# Patient Record
Sex: MALE | Race: WHITE | NOT HISPANIC OR LATINO | Employment: FULL TIME
[De-identification: names, ages, dates, MRNs, and addresses within clinical notes are randomized per-mention and may not be internally consistent; named-entity substitution may affect disease eponyms.]

---

## 2018-02-07 ENCOUNTER — RECORDS - HEALTHEAST (OUTPATIENT)
Dept: ADMINISTRATIVE | Facility: OTHER | Age: 48
End: 2018-02-07

## 2018-02-07 ENCOUNTER — OFFICE VISIT (OUTPATIENT)
Dept: FAMILY MEDICINE | Facility: CLINIC | Age: 48
End: 2018-02-07
Payer: OTHER MISCELLANEOUS

## 2018-02-07 VITALS
OXYGEN SATURATION: 97 % | HEIGHT: 65 IN | DIASTOLIC BLOOD PRESSURE: 92 MMHG | TEMPERATURE: 98 F | WEIGHT: 202 LBS | HEART RATE: 70 BPM | BODY MASS INDEX: 33.66 KG/M2 | SYSTOLIC BLOOD PRESSURE: 147 MMHG

## 2018-02-07 DIAGNOSIS — I10 BENIGN ESSENTIAL HYPERTENSION: Chronic | ICD-10-CM

## 2018-02-07 DIAGNOSIS — S46.011A RUPTURE OF RIGHT SUPRASPINATUS TENDON, INITIAL ENCOUNTER: ICD-10-CM

## 2018-02-07 DIAGNOSIS — M25.511 ACUTE PAIN OF RIGHT SHOULDER: Primary | ICD-10-CM

## 2018-02-07 DIAGNOSIS — Z02.6 ENCOUNTER RELATED TO WORKER'S COMPENSATION CLAIM: ICD-10-CM

## 2018-02-07 DIAGNOSIS — S46.811A TEAR OF RIGHT INFRASPINATUS TENDON, INITIAL ENCOUNTER: ICD-10-CM

## 2018-02-07 PROBLEM — E66.09 CLASS 1 OBESITY DUE TO EXCESS CALORIES WITHOUT SERIOUS COMORBIDITY IN ADULT: Chronic | Status: ACTIVE | Noted: 2018-02-07

## 2018-02-07 PROBLEM — E66.811 CLASS 1 OBESITY DUE TO EXCESS CALORIES WITHOUT SERIOUS COMORBIDITY IN ADULT: Chronic | Status: ACTIVE | Noted: 2018-02-07

## 2018-02-07 RX ORDER — IBUPROFEN 200 MG
200 TABLET ORAL EVERY 4 HOURS PRN
COMMUNITY
Start: 2018-02-07 | End: 2022-08-28

## 2018-02-07 ASSESSMENT — ENCOUNTER SYMPTOMS
CARDIOVASCULAR NEGATIVE: 1
GASTROINTESTINAL NEGATIVE: 1
RESPIRATORY NEGATIVE: 1

## 2018-02-07 NOTE — MR AVS SNAPSHOT
After Visit Summary   2/7/2018    Dyllan Quintanilla    MRN: [id number]           Patient Information     Date Of Birth          1970        Visit Information        Provider Department      2/7/2018 9:20 AM Jose Cope MD [facility]        Today's Diagnoses     Acute pain of right shoulder    -  1    Encounter related to worker's compensation claim          Care Instructions    MRI of your right shoulder  Follow up on you high blood pressure and health maintenance as soon as you are able to.   Ok to take anti-inflammatory medications to help with your pain such as Ibuprofen as you already are doing.     =======================================================================  Your medication list is printed, please keep this with you, it is helpful to bring this current list to any other medical appointments, the emergency room or hospital.    If you had lab testing today and your results are reassuring or normal they will be be mailed to you within 7 days.     If the lab tests need quick action we will call you with the results.   The phone number we will call with results is # [phone] (home) . If this is not the best number please call our clinic and change the number.    If you need any refills please call your pharmacy and they will contact us.    If you have any further concerns or wish to schedule another appointment you must call our office during normal business hours  [phone] (8-5:00 M-F)  If you have urgent medical questions that cannot wait  you may also call [phone] at any time of day.  If you have a medical emergency please call [phone].    Thank you for coming to [facility].            Follow-ups after your visit        Who to contact     Please call your clinic at [phone] to:    Ask questions about your health    Make or cancel appointments    Discuss your medicines    Learn about your test results    Speak to your doctor   If you have  "compliments or concerns about an experience at your clinic, or if you wish to file a complaint, please contact [facility] Patient Relations at [phone] or email us at [email or web address]         Additional Information About Your Visit        [employer] Information     [employer] is an electronic gateway that provides easy, online access to your medical records. With [employer], you can request a clinic appointment, read your test results, renew a prescription or communicate with your care team.     To sign up for [employer] visit the website at [email or web address]   You will be asked to enter the access code listed below, as well as some personal information. Please follow the directions to create your username and password.     Your access code is: [email or web address]  Expires: 2018 10:08 AM     Your access code will  in 90 days. If you need help or a new code, please contact your [facility] or call [phone] for assistance.        Care EveryWhere ID     This is your Care EveryWhere ID. This could be used by other organizations to access your [place] medical records  [id number]        Your Vitals Were     Pulse Temperature Height Pulse Oximetry BMI (Body Mass Index)       70 98  F (36.7  C) (Oral) 5' 4.57\" (164 cm) 97% 34.07 kg/m2        Blood Pressure from Last 3 Encounters:   18 (!) 147/92    Weight from Last 3 Encounters:   18 202 lb (91.6 kg)              We Performed the Following     MR Shoulder Right w/o Contrast        Primary Care Provider Office Phone # Fax #    Jose Cope [phone]       [facility] [place] [address]        Equal Access to Services     VERNELL CONKLIN : Hadii alesha French, waaxda luqadaha, qaybta kaalmada lawrenceyada, lashaun yepez. So [facility] [phone].    ATENCIÓN: Si habla español, tiene a haider disposición servicios gratuitos de " zuleika lingüísticmasha. Chavez al [phone].    We comply with applicable federal civil rights laws and [place] laws. We do not discriminate on the basis of race, color, national origin, age, disability, sex, sexual orientation, or gender identity.            Thank you!     Thank you for choosing [facility]  for your care. Our goal is always to provide you with excellent care. Hearing back from our patients is one way we can continue to improve our services. Please take a few minutes to complete the written survey that you may receive in the mail after your visit with us. Thank you!             Your Updated Medication List - Protect others around you: Learn how to safely use, store and throw away your medicines at www.disposemymeds.org.      Notice  As of 2/7/2018 10:09 AM    You have not been prescribed any medications.

## 2018-02-07 NOTE — PROGRESS NOTES
Assessment and Plan     1. Acute pain of right shoulder  Concern for rotator cuff rupture. Continue ibuprofen. Plan for MRI and further management. Note written for no more than 5lbs of activity with that shoulder.  - MR Shoulder Right w/o Contrast    2. Encounter related to worker's compensation claim  As above.  - MR Shoulder Right w/o Contrast    3. Benign essential hypertension  Recommended that the patient start being seen for this. He is here for work-comp today so was not otherwise evaluated.     Options for treatment and follow-up care were reviewed with the patient and/or guardian. Dyllan Quintanilla and/or guardian engaged in the decision making process and verbalized understanding of the options discussed and agreed with the final plan. I answered all of their questions.    Jose Cope III, MD, FAAFP  [facility] Faculty  02/07/18 10:08 AM           HPI:       Dyllan Quintanilla is a 47 year old  male  Patient presents with:  Work Comp: Right shoulder injury on 2/1/2018.  Medication Reconciliation: Completed. Currently taking OTC Ibuprofen 400mg as needed.     On 1 Feb he hurt his shoulder at work was working on a trailer up high and transferred to a ladder. Douglas a pop and had sudden onset of pain. Slightly better today. Didn't fall. At rest, there is minimal to do pain. Pain increases with movement. Feels week. No trauma. Denies numbness, tingling, or weakness of the rest of his hand. Some improvement with ibuprofen.    The patient denies signs and symptoms of hypertensive emergency.         PMHX:     Patient Active Problem List   Diagnosis     Benign essential hypertension     Acute pain of right shoulder     Class 1 obesity due to excess calories without serious comorbidity in adult       Current Outpatient Prescriptions   Medication Sig Dispense Refill     ibuprofen (ADVIL/MOTRIN) 200 MG tablet Take 1 tablet (200 mg) by mouth every 4 hours as needed for mild pain         Social History  "    Social History     Marital status: Single     Spouse name: N/A     Number of children: 4     Years of education: N/A     Occupational History     Not on file.     Social History Main Topics     Smoking status: Former Smoker     Types: Cigarettes     Smokeless tobacco: Never Used      Comment: Girlfriend smokes but outside     Alcohol use Yes     Drug use: No     Sexual activity: Yes     Partners: Female     Birth control/ protection: None     Other Topics Concern     Not on file     Social History Narrative    Has four daughters       No Known Allergies    No results found for this or any previous visit (from the past 24 hour(s)).         Review of Systems:     Review of Systems   Respiratory: Negative.    Cardiovascular: Negative.    Gastrointestinal: Negative.    All other systems reviewed and are negative.           Physical Exam:     Vitals:    02/07/18 0935 02/07/18 0947   BP: (!) 163/107 (!) 147/92   Pulse: 70    Temp: 98  F (36.7  C)    TempSrc: Oral    SpO2: 97%    Weight: 202 lb (91.6 kg)    Height: 5' 4.57\" (164 cm)      Body mass index is 34.07 kg/(m^2).    Physical Exam   Constitutional: He is oriented to person, place, and time and well-developed, well-nourished, and in no distress. He appears to not be writhing in pain.  Non-toxic appearance. He does not have a sickly appearance.   Pulmonary/Chest: No respiratory distress.   Musculoskeletal:        Right shoulder: He exhibits decreased range of motion (difficulty bring the arm across the body), pain (with active abduction and external rotation) and decreased strength (empty can and external rotation). He exhibits no tenderness, no bony tenderness, no swelling, no deformity and normal pulse.        Arms:  Neurological: He is alert and oriented to person, place, and time. Gait normal. GCS score is 15.   Skin: Skin is warm. No rash noted. No erythema.   Psychiatric: Affect normal.   Nursing note and vitals reviewed.      XR Shoulder Right 2 or More " S2/1/2018  [facility]  Result Narrative   XR SHOULDER RIGHT 2 OR MORE VWS  2/1/2018 6:43 PM    INDICATION: Right shoulder pain  COMPARISON: None.    FINDINGS: Mild degenerative changes.. No fracture or dislocation.

## 2018-02-07 NOTE — LETTER
RETURN TO WORK/SCHOOL FORM    2/7/2018    Re: Dyllan Quintanilla  1970      To Whom It May Concern:     Dyllan Quintanilla was seen in clinic today.  He may return to work with restrictions on 2/7/18.          Restrictions:  Limit use of right arm to lifting, carrying, pushing or pulling  no more than 5 pounds for 2 weeks. Will re-evaluate at that time.         Jose Cope MD  2/7/2018 10:16 AM

## 2018-02-07 NOTE — PATIENT INSTRUCTIONS
MRI of your right shoulder  Follow up on you high blood pressure and health maintenance as soon as you are able to.   Ok to take anti-inflammatory medications to help with your pain such as Ibuprofen as you already are doing.     =======================================================================  Your medication list is printed, please keep this with you, it is helpful to bring this current list to any other medical appointments, the emergency room or hospital.    If you had lab testing today and your results are reassuring or normal they will be be mailed to you within 7 days.     If the lab tests need quick action we will call you with the results.   The phone number we will call with results is # [phone] (home) . If this is not the best number please call our clinic and change the number.    If you need any refills please call your pharmacy and they will contact us.    If you have any further concerns or wish to schedule another appointment you must call our office during normal business hours  [phone] (8-5:00 M-F)  If you have urgent medical questions that cannot wait  you may also call [phone] at any time of day.  If you have a medical emergency please call [phone].    Thank you for coming to [facility].      Referral for ( TEST )  :      MR Shoulder Right w/o Contrast  LOCATION/PLACE/Provider :    [facility]  DATE & TIME :     2- at 1:00  PHONE :     [phone]  FAX :     [phone]  Appointment made by clinic staff/:    Ayleen      Referral for (Test): Orthopedic referral  Location/Place/Provider: [facility]   Date/Time: Monday, 2/26/2018 @ 12:30pm   Phone: [phone]  Fax: [phone]  Additional information/prep.:   Scheduled by: Mia COSTELLO CMA

## 2018-02-14 ENCOUNTER — HOSPITAL ENCOUNTER (OUTPATIENT)
Dept: RADIOLOGY | Facility: HOSPITAL | Age: 48
Discharge: HOME | End: 2018-02-14
Attending: FAMILY MEDICINE

## 2018-02-14 ENCOUNTER — HOSPITAL ENCOUNTER (OUTPATIENT)
Dept: MRI IMAGING | Facility: HOSPITAL | Age: 48
Discharge: HOME | End: 2018-02-14
Attending: FAMILY MEDICINE

## 2018-02-14 DIAGNOSIS — M25.511 ACUTE SHOULDER PAIN DUE TO TRAUMA, RIGHT: ICD-10-CM

## 2018-02-14 DIAGNOSIS — S60.559A: ICD-10-CM

## 2018-02-14 DIAGNOSIS — G89.11 ACUTE SHOULDER PAIN DUE TO TRAUMA, RIGHT: ICD-10-CM

## 2018-02-14 DIAGNOSIS — S00.95XA: ICD-10-CM

## 2018-02-19 NOTE — PROGRESS NOTES
1. Rupture of right supraspinatus tendon, initial encounter  Needs surgery. Referral placed.  - ORTHOPEDICS ADULT REFERRAL    2. Tear of right infraspinatus tendon, initial encounter  - ORTHOPEDICS ADULT REFERRAL    Jose Cope III, MD, FAAFP  [facility] Residency Faculty  02/19/18 7:56 AM

## 2018-02-26 ENCOUNTER — RECORDS - HEALTHEAST (OUTPATIENT)
Dept: ADMINISTRATIVE | Facility: OTHER | Age: 48
End: 2018-02-26

## 2019-06-11 ENCOUNTER — OFFICE VISIT - HEALTHEAST (OUTPATIENT)
Dept: CARDIOLOGY | Facility: CLINIC | Age: 49
End: 2019-06-11

## 2019-06-11 DIAGNOSIS — I25.10 CORONARY ARTERY CALCIFICATION SEEN ON CAT SCAN: ICD-10-CM

## 2019-06-11 LAB
CHOLEST SERPL-MCNC: 192 MG/DL
FASTING STATUS PATIENT QL REPORTED: NO
HDLC SERPL-MCNC: 51 MG/DL
LDLC SERPL CALC-MCNC: 120 MG/DL
TRIGL SERPL-MCNC: 107 MG/DL

## 2019-06-11 ASSESSMENT — MIFFLIN-ST. JEOR: SCORE: 1704.94

## 2019-06-19 ENCOUNTER — HOSPITAL ENCOUNTER (OUTPATIENT)
Dept: NUCLEAR MEDICINE | Facility: CLINIC | Age: 49
Discharge: HOME | End: 2019-06-19
Attending: INTERNAL MEDICINE

## 2019-06-19 ENCOUNTER — HOSPITAL ENCOUNTER (OUTPATIENT)
Dept: CARDIOLOGY | Facility: CLINIC | Age: 49
Discharge: HOME | End: 2019-06-19
Attending: INTERNAL MEDICINE

## 2019-06-19 DIAGNOSIS — I25.10 CORONARY ARTERY CALCIFICATION SEEN ON CAT SCAN: ICD-10-CM

## 2019-06-19 LAB
CV STRESS CURRENT BP HE: NORMAL
CV STRESS CURRENT HR HE: 100
CV STRESS CURRENT HR HE: 101
CV STRESS CURRENT HR HE: 101
CV STRESS CURRENT HR HE: 106
CV STRESS CURRENT HR HE: 107
CV STRESS CURRENT HR HE: 107
CV STRESS CURRENT HR HE: 111
CV STRESS CURRENT HR HE: 112
CV STRESS CURRENT HR HE: 113
CV STRESS CURRENT HR HE: 116
CV STRESS CURRENT HR HE: 120
CV STRESS CURRENT HR HE: 125
CV STRESS CURRENT HR HE: 128
CV STRESS CURRENT HR HE: 130
CV STRESS CURRENT HR HE: 131
CV STRESS CURRENT HR HE: 131
CV STRESS CURRENT HR HE: 71
CV STRESS CURRENT HR HE: 76
CV STRESS CURRENT HR HE: 78
CV STRESS CURRENT HR HE: 91
CV STRESS CURRENT HR HE: 92
CV STRESS CURRENT HR HE: 93
CV STRESS CURRENT HR HE: 94
CV STRESS CURRENT HR HE: 97
CV STRESS CURRENT HR HE: 98
CV STRESS CURRENT HR HE: 99
CV STRESS DEVIATION TIME HE: NORMAL
CV STRESS ECHO PERCENT HR HE: NORMAL
CV STRESS EXERCISE STAGE HE: NORMAL
CV STRESS EXERCISE STAGE REACHED HE: NORMAL
CV STRESS FINAL RESTING BP HE: NORMAL
CV STRESS FINAL RESTING HR HE: 92
CV STRESS MAX HR HE: 131
CV STRESS MAX TREADMILL GRADE HE: 14
CV STRESS MAX TREADMILL SPEED HE: 3.4
CV STRESS PEAK DIA BP HE: NORMAL
CV STRESS PEAK SYS BP HE: NORMAL
CV STRESS PHASE HE: NORMAL
CV STRESS PROTOCOL HE: NORMAL
CV STRESS RESTING PT POSITION HE: NORMAL
CV STRESS RESTING PT POSITION HE: NORMAL
CV STRESS ST DEVIATION AMOUNT HE: NORMAL
CV STRESS ST DEVIATION ELEVATION HE: NORMAL
CV STRESS ST EVELATION AMOUNT HE: NORMAL
CV STRESS TEST TYPE HE: NORMAL
CV STRESS TOTAL STAGE TIME MIN 1 HE: NORMAL
NUC STRESS EJECTION FRACTION: 63 %
STRESS ECHO BASELINE BP: NORMAL
STRESS ECHO BASELINE HR: 72
STRESS ECHO CALCULATED PERCENT HR: 77 %
STRESS ECHO LAST STRESS BP: NORMAL
STRESS ECHO LAST STRESS HR: 131
STRESS ECHO POST ESTIMATED WORKLOAD: 10.3
STRESS ECHO POST EXERCISE DUR MIN: 8
STRESS ECHO POST EXERCISE DUR SEC: 59
STRESS ECHO TARGET HR: 145

## 2019-06-21 ENCOUNTER — AMBULATORY - HEALTHEAST (OUTPATIENT)
Dept: CARDIOLOGY | Facility: CLINIC | Age: 49
End: 2019-06-21

## 2019-06-21 DIAGNOSIS — E78.5 HYPERLIPIDEMIA LDL GOAL <70: ICD-10-CM

## 2019-06-21 DIAGNOSIS — I25.10 CORONARY ARTERY CALCIFICATION SEEN ON CAT SCAN: ICD-10-CM

## 2020-04-14 ENCOUNTER — COMMUNICATION - HEALTHEAST (OUTPATIENT)
Dept: SCHEDULING | Facility: CLINIC | Age: 50
End: 2020-04-14

## 2021-05-29 NOTE — PROGRESS NOTES
===View-only below this line===    ----- Message -----  From: Suma Lindsay MD  Sent: 6/19/2019   3:01 PM  To: Brandon Salinas RN    I talked with Dyllan on the phone and went over his results. Can you help get him in to see me in 3 months with a cholesterol check at that time?    Thanks, EG
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
## 2021-05-29 NOTE — PATIENT INSTRUCTIONS - HE
- Set up an appointment with Internal medicine, the [facility] clinic in the [place], [place]. See either Dr. Lester or Dr. Barron.     - Start lisinopril 5 mg daily for your blood pressure. Call if you have a dry cough. Stop the medicine immediately if you get tongue or lip swelling.    - Stress test    - Check cholesterol today    - See me in 1 year

## 2021-06-03 VITALS — HEIGHT: 66 IN | BODY MASS INDEX: 32.14 KG/M2 | WEIGHT: 200 LBS

## 2021-06-07 NOTE — TELEPHONE ENCOUNTER
RN Triage,    Patient's friend is calling on behalf of patient (verified ok to speak with her). One week ago Dyllan woke up with purple and swollen ankle. Unsure what happened to it. According to leannerDiann, the swelling and discoloration is getting worse and worse. Today, Dyllan is having a very difficult time bearing weight and is unable to walk more than a few steps.    Given difficulty bearing weight, I advised that Dyllan be seen in ER/UR. Diann was agreeable to this plan and was going to consider taking him to [facility] or [facility].     Shaina Esparza RN  [facility] Nurse Advisor    Reason for Disposition    Can't stand (bear weight) or walk (e.g., 4 steps)    Protocols used: ANKLE AND FOOT INJURY-A-[place]

## 2021-07-03 NOTE — ADDENDUM NOTE
Addendum Note by Jason Salinas RN at 6/21/2019  2:32 PM     Author: Jason Salinas RN Service: -- Author Type: Registered Nurse    Filed: 6/21/2019  2:35 PM Encounter Date: 6/21/2019 Status: Signed    : Jason Salinas RN (Registered Nurse)    Addended by: JASON SALINAS on: 6/21/2019 02:35 PM        Modules accepted: Orders

## 2022-08-28 ENCOUNTER — APPOINTMENT (OUTPATIENT)
Dept: RADIOLOGY | Facility: HOSPITAL | Age: 52
End: 2022-08-28
Attending: EMERGENCY MEDICINE

## 2022-08-28 ENCOUNTER — HOSPITAL ENCOUNTER (EMERGENCY)
Facility: HOSPITAL | Age: 52
Discharge: HOME | End: 2022-08-28
Attending: EMERGENCY MEDICINE | Admitting: EMERGENCY MEDICINE
Payer: OTHER MISCELLANEOUS

## 2022-08-28 VITALS
HEART RATE: 83 BPM | SYSTOLIC BLOOD PRESSURE: 156 MMHG | BODY MASS INDEX: 33.32 KG/M2 | TEMPERATURE: 97.7 F | DIASTOLIC BLOOD PRESSURE: 93 MMHG | HEIGHT: 65 IN | RESPIRATION RATE: 15 BRPM | WEIGHT: 200 LBS | OXYGEN SATURATION: 96 %

## 2022-08-28 DIAGNOSIS — M79.661 PAIN IN BOTH LOWER LEGS: ICD-10-CM

## 2022-08-28 DIAGNOSIS — M79.671 PAIN IN BOTH FEET: ICD-10-CM

## 2022-08-28 DIAGNOSIS — T07.XXXA ABRASIONS OF MULTIPLE SITES: ICD-10-CM

## 2022-08-28 DIAGNOSIS — M79.662 PAIN IN BOTH LOWER LEGS: ICD-10-CM

## 2022-08-28 DIAGNOSIS — M79.672 PAIN IN BOTH FEET: ICD-10-CM

## 2022-08-28 DIAGNOSIS — I10 HYPERTENSION, UNSPECIFIED TYPE: ICD-10-CM

## 2022-08-28 DIAGNOSIS — S93.401A SPRAIN OF RIGHT ANKLE, UNSPECIFIED LIGAMENT, INITIAL ENCOUNTER: ICD-10-CM

## 2022-08-28 DIAGNOSIS — T14.8XXA CRUSH INJURY: ICD-10-CM

## 2022-08-28 LAB
ALBUMIN SERPL-MCNC: 3.8 G/DL (ref 3.5–5)
ALP SERPL-CCNC: 68 U/L (ref 45–120)
ALT SERPL W P-5'-P-CCNC: 22 U/L (ref 0–45)
ANION GAP SERPL CALCULATED.3IONS-SCNC: 9 MMOL/L (ref 5–18)
APTT PPP: 23 SECONDS (ref 22–38)
AST SERPL W P-5'-P-CCNC: 19 U/L (ref 0–40)
BASOPHILS # BLD AUTO: 0 10E3/UL (ref 0–0.2)
BASOPHILS NFR BLD AUTO: 0 %
BILIRUB SERPL-MCNC: 0.3 MG/DL (ref 0–1)
BUN SERPL-MCNC: 21 MG/DL (ref 8–22)
CALCIUM SERPL-MCNC: 8.7 MG/DL (ref 8.5–10.5)
CHLORIDE BLD-SCNC: 105 MMOL/L (ref 98–107)
CO2 SERPL-SCNC: 23 MMOL/L (ref 22–31)
CREAT SERPL-MCNC: 0.93 MG/DL (ref 0.7–1.3)
EOSINOPHIL # BLD AUTO: 0 10E3/UL (ref 0–0.7)
EOSINOPHIL NFR BLD AUTO: 0 %
ERYTHROCYTE [DISTWIDTH] IN BLOOD BY AUTOMATED COUNT: 13.2 % (ref 10–15)
GFR SERPL CREATININE-BSD FRML MDRD: >90 ML/MIN/1.73M2
GLUCOSE BLD-MCNC: 131 MG/DL (ref 70–125)
HCT VFR BLD AUTO: 44 % (ref 40–53)
HGB BLD-MCNC: 14.6 G/DL (ref 13.3–17.7)
IMM GRANULOCYTES # BLD: 0 10E3/UL
IMM GRANULOCYTES NFR BLD: 0 %
INR PPP: 1.01 (ref 0.85–1.15)
LYMPHOCYTES # BLD AUTO: 1.2 10E3/UL (ref 0.8–5.3)
LYMPHOCYTES NFR BLD AUTO: 12 %
MCH RBC QN AUTO: 30.6 PG (ref 26.5–33)
MCHC RBC AUTO-ENTMCNC: 33.2 G/DL (ref 31.5–36.5)
MCV RBC AUTO: 92 FL (ref 78–100)
MONOCYTES # BLD AUTO: 0.5 10E3/UL (ref 0–1.3)
MONOCYTES NFR BLD AUTO: 5 %
NEUTROPHILS # BLD AUTO: 8.4 10E3/UL (ref 1.6–8.3)
NEUTROPHILS NFR BLD AUTO: 83 %
NRBC # BLD AUTO: 0 10E3/UL
NRBC BLD AUTO-RTO: 0 /100
PLATELET # BLD AUTO: 226 10E3/UL (ref 150–450)
POTASSIUM BLD-SCNC: 4.7 MMOL/L (ref 3.5–5)
PROT SERPL-MCNC: 7 G/DL (ref 6–8)
RBC # BLD AUTO: 4.77 10E6/UL (ref 4.4–5.9)
SODIUM SERPL-SCNC: 137 MMOL/L (ref 136–145)
WBC # BLD AUTO: 10.2 10E3/UL (ref 4–11)

## 2022-08-28 PROCEDURE — 85004 AUTOMATED DIFF WBC COUNT: CPT | Performed by: STUDENT IN AN ORGANIZED HEALTH CARE EDUCATION/TRAINING PROGRAM

## 2022-08-28 PROCEDURE — 73590 X-RAY EXAM OF LOWER LEG: CPT | Mod: RT

## 2022-08-28 PROCEDURE — 73630 X-RAY EXAM OF FOOT: CPT | Mod: RT

## 2022-08-28 PROCEDURE — 36415 COLL VENOUS BLD VENIPUNCTURE: CPT | Performed by: STUDENT IN AN ORGANIZED HEALTH CARE EDUCATION/TRAINING PROGRAM

## 2022-08-28 PROCEDURE — 250N000009 HC RX 250: Performed by: EMERGENCY MEDICINE

## 2022-08-28 PROCEDURE — 250N000011 HC RX IP 250 OP 636: Performed by: EMERGENCY MEDICINE

## 2022-08-28 PROCEDURE — 85610 PROTHROMBIN TIME: CPT | Performed by: STUDENT IN AN ORGANIZED HEALTH CARE EDUCATION/TRAINING PROGRAM

## 2022-08-28 PROCEDURE — 90471 IMMUNIZATION ADMIN: CPT | Performed by: EMERGENCY MEDICINE

## 2022-08-28 PROCEDURE — 96375 TX/PRO/DX INJ NEW DRUG ADDON: CPT

## 2022-08-28 PROCEDURE — 85730 THROMBOPLASTIN TIME PARTIAL: CPT | Performed by: STUDENT IN AN ORGANIZED HEALTH CARE EDUCATION/TRAINING PROGRAM

## 2022-08-28 PROCEDURE — 96376 TX/PRO/DX INJ SAME DRUG ADON: CPT

## 2022-08-28 PROCEDURE — 80053 COMPREHEN METABOLIC PANEL: CPT | Performed by: STUDENT IN AN ORGANIZED HEALTH CARE EDUCATION/TRAINING PROGRAM

## 2022-08-28 PROCEDURE — 96374 THER/PROPH/DIAG INJ IV PUSH: CPT

## 2022-08-28 PROCEDURE — 90715 TDAP VACCINE 7 YRS/> IM: CPT | Performed by: EMERGENCY MEDICINE

## 2022-08-28 PROCEDURE — 99285 EMERGENCY DEPT VISIT HI MDM: CPT | Mod: 25

## 2022-08-28 PROCEDURE — 73560 X-RAY EXAM OF KNEE 1 OR 2: CPT | Mod: LT

## 2022-08-28 RX ORDER — KETOROLAC TROMETHAMINE 15 MG/ML
15 INJECTION, SOLUTION INTRAMUSCULAR; INTRAVENOUS ONCE
Status: COMPLETED | OUTPATIENT
Start: 2022-08-28 | End: 2022-08-28

## 2022-08-28 RX ORDER — GINSENG 100 MG
CAPSULE ORAL ONCE
Status: COMPLETED | OUTPATIENT
Start: 2022-08-28 | End: 2022-08-28

## 2022-08-28 RX ORDER — IBUPROFEN 600 MG/1
600 TABLET, FILM COATED ORAL EVERY 8 HOURS PRN
Qty: 20 TABLET | Refills: 0 | Status: SHIPPED | OUTPATIENT
Start: 2022-08-28 | End: 2023-01-30

## 2022-08-28 RX ORDER — LIDOCAINE 40 MG/G
CREAM TOPICAL
Status: DISCONTINUED | OUTPATIENT
Start: 2022-08-28 | End: 2022-08-28 | Stop reason: HOSPADM

## 2022-08-28 RX ADMIN — BACITRACIN: 500 OINTMENT TOPICAL at 10:41

## 2022-08-28 RX ADMIN — HYDROMORPHONE HYDROCHLORIDE 1 MG: 1 INJECTION, SOLUTION INTRAMUSCULAR; INTRAVENOUS; SUBCUTANEOUS at 07:41

## 2022-08-28 RX ADMIN — CLOSTRIDIUM TETANI TOXOID ANTIGEN (FORMALDEHYDE INACTIVATED), CORYNEBACTERIUM DIPHTHERIAE TOXOID ANTIGEN (FORMALDEHYDE INACTIVATED), BORDETELLA PERTUSSIS TOXOID ANTIGEN (GLUTARALDEHYDE INACTIVATED), BORDETELLA PERTUSSIS FILAMENTOUS HEMAGGLUTININ ANTIGEN (FORMALDEHYDE INACTIVATED), BORDETELLA PERTUSSIS PERTACTIN ANTIGEN, AND BORDETELLA PERTUSSIS FIMBRIAE 2/3 ANTIGEN 0.5 ML: 5; 2; 2.5; 5; 3; 5 INJECTION, SUSPENSION INTRAMUSCULAR at 08:44

## 2022-08-28 RX ADMIN — HYDROMORPHONE HYDROCHLORIDE 1 MG: 1 INJECTION, SOLUTION INTRAMUSCULAR; INTRAVENOUS; SUBCUTANEOUS at 08:38

## 2022-08-28 RX ADMIN — KETOROLAC TROMETHAMINE 15 MG: 15 INJECTION, SOLUTION INTRAMUSCULAR; INTRAVENOUS at 07:40

## 2022-08-28 ASSESSMENT — ACTIVITIES OF DAILY LIVING (ADL): ADLS_ACUITY_SCORE: 35

## 2022-08-28 ASSESSMENT — ENCOUNTER SYMPTOMS
ABDOMINAL PAIN: 0
BACK PAIN: 0

## 2022-08-28 NOTE — ED NOTES
Cleansed patient abrasions on left knee and ankle, patted dry, applied bacitracin and placed bandage over abrasions. Fitted patient for crutches and applied ankle brace to right ankle. Educated patient on crutch use and patient able to walk with crutches and acknowledged understanding of crutch and ankle brace use.

## 2022-08-28 NOTE — DISCHARGE INSTRUCTIONS
Make sure you take your blood pressure medication as prescribed.    Check your blood pressure daily at home and record the results.  Bring the results with you to your doctor's appointment.

## 2022-08-28 NOTE — Clinical Note
Dyllan Quintanilla was seen and treated in our emergency department on 8/28/2022.  He may return to work on 09/01/2022.       If you have any questions or concerns, please don't hesitate to call.      Sulaiman Santos MD
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
## 2022-08-29 ENCOUNTER — PATIENT OUTREACH (OUTPATIENT)
Dept: CARE COORDINATION | Facility: CLINIC | Age: 52
End: 2022-08-29
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
## 2022-08-31 ENCOUNTER — OFFICE VISIT (OUTPATIENT)
Dept: FAMILY MEDICINE | Facility: CLINIC | Age: 52
End: 2022-08-31
Payer: OTHER MISCELLANEOUS

## 2022-08-31 ENCOUNTER — ANCILLARY PROCEDURE (OUTPATIENT)
Dept: GENERAL RADIOLOGY | Facility: CLINIC | Age: 52
End: 2022-08-31
Attending: STUDENT IN AN ORGANIZED HEALTH CARE EDUCATION/TRAINING PROGRAM
Payer: OTHER MISCELLANEOUS

## 2022-08-31 VITALS
DIASTOLIC BLOOD PRESSURE: 100 MMHG | WEIGHT: 200 LBS | HEIGHT: 66 IN | BODY MASS INDEX: 32.14 KG/M2 | OXYGEN SATURATION: 97 % | RESPIRATION RATE: 22 BRPM | SYSTOLIC BLOOD PRESSURE: 157 MMHG | HEART RATE: 78 BPM | TEMPERATURE: 97.9 F

## 2022-08-31 DIAGNOSIS — S99.912A ANKLE INJURY, LEFT, INITIAL ENCOUNTER: ICD-10-CM

## 2022-08-31 DIAGNOSIS — M79.662 PAIN OF LEFT LOWER LEG: ICD-10-CM

## 2022-08-31 DIAGNOSIS — M25.571 ACUTE RIGHT ANKLE PAIN: ICD-10-CM

## 2022-08-31 DIAGNOSIS — M25.571 ACUTE RIGHT ANKLE PAIN: Primary | ICD-10-CM

## 2022-08-31 PROCEDURE — 99204 OFFICE O/P NEW MOD 45 MIN: CPT | Mod: GC

## 2022-08-31 PROCEDURE — 73610 X-RAY EXAM OF ANKLE: CPT | Mod: TC | Performed by: RADIOLOGY
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
## 2022-08-31 NOTE — LETTER
[facility] [address] [place] [address]  Phone: [phone]  Fax: [phone]    August 31, 2022        Dyllan Quinatnilla  [address]  [place] [address]          To whom it may concern:    RE: Dyllan Quintanilla    Patient was seen and treated today at our clinic and missed work. He will be unable to return to work until September 9th at the earliest due to an ongoing injury. He will continue to be re-evaluated on a weekly basis.    Please contact me for questions or concerns.      Sincerely,        Frank Domínguez MD

## 2022-09-02 NOTE — PROGRESS NOTES
Preceptor Attestation:   Patient seen, evaluated and discussed with the resident. I have verified the content of the note, which accurately reflects my assessment of the patient and the plan of care.  Supervising Physician:Sarah Hinojosa MD  [facility]

## 2022-09-07 ENCOUNTER — OFFICE VISIT (OUTPATIENT)
Dept: FAMILY MEDICINE | Facility: CLINIC | Age: 52
End: 2022-09-07

## 2022-09-07 VITALS
OXYGEN SATURATION: 96 % | SYSTOLIC BLOOD PRESSURE: 154 MMHG | RESPIRATION RATE: 20 BRPM | TEMPERATURE: 98.6 F | BODY MASS INDEX: 28.57 KG/M2 | DIASTOLIC BLOOD PRESSURE: 104 MMHG | HEART RATE: 90 BPM | WEIGHT: 177 LBS

## 2022-09-07 DIAGNOSIS — S99.911D INJURY OF RIGHT ANKLE, SUBSEQUENT ENCOUNTER: ICD-10-CM

## 2022-09-07 DIAGNOSIS — S99.912D INJURY OF LEFT ANKLE, SUBSEQUENT ENCOUNTER: Primary | ICD-10-CM

## 2022-09-07 PROCEDURE — 99213 OFFICE O/P EST LOW 20 MIN: CPT | Mod: GC | Performed by: STUDENT IN AN ORGANIZED HEALTH CARE EDUCATION/TRAINING PROGRAM
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
## 2022-09-07 NOTE — LETTER
RETURN TO WORK/SCHOOL FORM    9/7/2022    Re: Dyllan Quintanilla  1970      To Whom It May Concern:     Dyllan Quintanilla was seen in clinic today. Patient may not return to work at this time. Patient will be reassessed on 9/23 and determination will be made at that time whether or not patient can return after that date.         Elpidio Castillo MD  9/7/2022 4:26 PM

## 2022-09-08 NOTE — PROGRESS NOTES
Preceptor Attestation:   Patient seen, evaluated and discussed with the resident. I have verified the content of the note, which accurately reflects my assessment of the patient and the plan of care.    Supervising Physician:Jose Cope    [facility]

## 2022-09-12 ENCOUNTER — HOSPITAL ENCOUNTER (OUTPATIENT)
Dept: PHYSICAL THERAPY | Facility: REHABILITATION | Age: 52
Discharge: HOME | End: 2022-09-12
Payer: OTHER MISCELLANEOUS

## 2022-09-12 DIAGNOSIS — S99.912D INJURY OF LEFT ANKLE, SUBSEQUENT ENCOUNTER: ICD-10-CM

## 2022-09-12 DIAGNOSIS — S99.911D INJURY OF RIGHT ANKLE, SUBSEQUENT ENCOUNTER: ICD-10-CM

## 2022-09-12 PROCEDURE — 97110 THERAPEUTIC EXERCISES: CPT | Mod: GP | Performed by: PHYSICAL THERAPIST

## 2022-09-12 PROCEDURE — 97161 PT EVAL LOW COMPLEX 20 MIN: CPT | Mod: GP | Performed by: PHYSICAL THERAPIST

## 2022-09-12 NOTE — ADDENDUM NOTE
Encounter addended by: Lydia Duron PT on: 9/12/2022 5:38 PM   Actions taken: Charge Capture section accepted

## 2022-09-12 NOTE — PROGRESS NOTES
09/12/22 0900   General Information   Type of Visit Initial OP Ortho PT Evaluation   Start of Care Date 09/12/22   Referring Physician Elpidio Castillo MD   Patient/Family Goals Statement Be able to walk good   Orders Evaluate and Treat   Date of Order 09/07/22   Certification Required? No   Medical Diagnosis Right and left ankle injuries 8/28/22   Surgical/Medical history reviewed Yes   Precautions/Limitations no known precautions/limitations       Present No   Body Part(s)   Body Part(s) Ankle/Foot   Presentation and Etiology   Pertinent history of current problem (include personal factors and/or comorbidities that impact the POC) Injury at work 8/28/22 hitching a susy from a semitrailer when truck pulled away and susy rolled over his legs as he was on his right side- right leg pinned under left leg. XCrays negative and pt given crutches and aircast braces.   Impairments A. Pain;B. Decreased WB tolerance;C. Swelling;D. Decreased ROM;E. Decreased flexibility;F. Decreased strength and endurance;G. Impaired balance;H. Impaired gait;I. Impaired skin integrity;M. Locking or catching;R. Other   Impairment comment Popping in both ankle, pain in knees   Functional Limitations perform activities of daily living;perform required work activities;perform desired leisure / sports activities   Symptom Location Both ankles - entire joint. He also reports knee pain- but not specific area   How/Where did it occur At work   Onset date of current episode/exacerbation 08/28/22   Chronicity New   Pain rating (0-10 point scale) Best (/10);Worst (/10)   Best (/10) 5   Worst (/10) 10   Pain quality A. Sharp;C. Aching   Frequency of pain/symptoms A. Constant   Pain/symptoms are: The same all the time  (Increased with WB)   Pain/symptoms exacerbated by B. Walking;J. ADL;K. Home tasks;L. Work tasks   Pain/symptoms eased by H. Cold;I. OTC medication(s);C. Rest   Progression of symptoms since onset: Improved   Current /  Previous Interventions   Diagnostic Tests: X-ray   X-ray Results Results   X-ray results IMPRESSION: Soft tissue swelling about the ankle. No evidence for fracture or disruption of ankle mortise.   Current Level of Function   Patient role/employment history A. Employed   Employment Comments    Living environment [place]   Current equipment-Gait/Locomotion Axillary crutches  (Poorly fit crutches. Axillary area does not have padding and per pt was issued this way from the ER. I encouraged him to obtain axillary pads. We did move the hand  up one notch to improve posture2.)   Fall Risk Screen   Fall screen completed by PT   Have you fallen 2 or more times in the past year? No   Have you fallen and had an injury in the past year? No   Is patient a fall risk? No   Abuse Screen (yes response referral indicated)   Feels Unsafe at Home or Work/School no   Feels Threatened by Someone no   Does Anyone Try to Keep You From Having Contact with Others or Doing Things Outside Your Home? no   Physical Signs of Abuse Present no   Ankle/Foot Objective Findings   Posterior Drawer Test Neg   Accessory Motion/Joint Mobility Marked tightness in both ankle and foot- all tarsals and cuneiforms reduced play   Anterior Drawer Test Neg   Side (if bilateral, select both right and left) Right;Left   Observation bilat lower leg swelling, moderate swelling of both ankles, open scrape, or rash lesion on shins/psorasis type   Integumentary skin lesion/rash on both lower legs- diffuse   Gait/Locomotion Walks very flexed in trunk using crutches- very slow, short strides bilaterally   Balance/ Proprioception (Single Leg Stance) Unable   Foot Position In Standing Pes cavus arch   Posture Kyphosis  (Lack of lumbar lordosis)   Ankle/Foot Special Tests Comments Intact to light touch both LE's. Knees ROM WFL   Right Gastroc (in WB) Flexibility Tight   Left Gastroc (in WB) Flexibility Tight   Right Soleus (in WB) Flexibility  Tight   Left Soleus (in WB) Flexibility Tight   Right PF/Inversion Strength 2   Right PF/Eversion Strength 2   Right PF Strength 2   Left PF/Inversion Strength 2+   Left PF/Eversion Strength 2+   Left PF Strength 2   Right DF (Knee Ext) AROM -1-2 degrees   Left DF (Knee Ext) AROM -1 deg   Right PF AROM 40 deg   Left PF AROM 40 deg   Right Calcanceal Inversion AROM 4 deg B   Left Calcanceal Inversion AROM 3 deg   Right Calcaneal Eversion AROM 3 deg B   Left Calcaneal Eversion AROM 2 deg   Right Great Toe Flexion AROM WFL/ Extension 60 deg   Left Great Toe Flexion AROM WFL, extension 60 deg   Right DF/Inversion Strength 2+   Left DF/Inversion Strength 2+   Right DF/Eversion Strength 2   Left DF/Eversion Strength 2+   Planned Therapy Interventions   Planned Therapy Interventions ADL retraining;balance training;gait training;joint mobilization;manual therapy;neuromuscular re-education;ROM;strengthening;stretching   Clinical Impression   Criteria for Skilled Therapeutic Interventions Met yes, treatment indicated   Influenced by the following impairments pain, decreased ROM and strength LE especially ankles   Functional limitations due to impairments walking, standing, balance, work,   Clinical Presentation Stable/Uncomplicated   Clinical Decision Making (Complexity) Low complexity   Therapy Frequency 2 times/Week   Predicted Duration of Therapy Intervention (days/wks) 12 weeks   Risk & Benefits of therapy have been explained Yes   Patient, Family & other staff in agreement with plan of care Yes   Clinical Impression Comments Dyllan is a 52 year old male who had a 3000 lb susy knock him onto his right side and roll over his legs and pin him until he could be freed on 8/28/22. Xrays of knees and ankles taken that day were negative for fracture. He is referred at this time for PT of these LE injuries. He is currently using crutches and aircast type ankle braces. PMH: HTN, CAD, hx of Mandible fx. Exam today shows marked loss  of bilateral AROM and strength. He does use crutches, but is able to take steps without them up to 5 feet. Patient would benefit from PT to improve ankle AROM. General LE strength, and progress LE gait and balance.   Education Assessment   Preferred Learning Style Listening;Reading;Demonstration;Pictures/video   ORTHO GOALS   PT Ortho Eval Goals 1;2   Ortho Goal 1   Goal Identifier Balance   Goal Description Pt will be able to SL stand for 6 seconds for WB and stairs without assistive device   Target Date 11/02/22   Ortho Goal 2   Goal Identifier Gait   Goal Description Pt will be able to walk 5 minutes without assistive device and <4/10 LE pain in 12 weeks   Target Date 12/09/22   Total Evaluation Time   PT Eval, Low Complexity Minutes (10621) 30

## 2022-09-19 ENCOUNTER — HOSPITAL ENCOUNTER (OUTPATIENT)
Dept: PHYSICAL THERAPY | Facility: REHABILITATION | Age: 52
Discharge: HOME | End: 2022-09-19
Payer: OTHER MISCELLANEOUS

## 2022-09-19 DIAGNOSIS — S99.911D INJURY OF RIGHT ANKLE, SUBSEQUENT ENCOUNTER: ICD-10-CM

## 2022-09-19 DIAGNOSIS — S99.912D INJURY OF LEFT ANKLE, SUBSEQUENT ENCOUNTER: Primary | ICD-10-CM

## 2022-09-19 PROCEDURE — 97110 THERAPEUTIC EXERCISES: CPT | Mod: GP | Performed by: PHYSICAL THERAPIST

## 2022-09-20 PROBLEM — T14.90XA SOFT TISSUE INJURY: Status: ACTIVE | Noted: 2022-09-20
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
## 2022-09-23 ENCOUNTER — OFFICE VISIT (OUTPATIENT)
Dept: FAMILY MEDICINE | Facility: CLINIC | Age: 52
End: 2022-09-23
Payer: OTHER MISCELLANEOUS

## 2022-09-23 ENCOUNTER — OFFICE VISIT (OUTPATIENT)
Dept: FAMILY MEDICINE | Facility: CLINIC | Age: 52
End: 2022-09-23

## 2022-09-23 VITALS
OXYGEN SATURATION: 96 % | WEIGHT: 179 LBS | SYSTOLIC BLOOD PRESSURE: 146 MMHG | HEIGHT: 66 IN | BODY MASS INDEX: 28.77 KG/M2 | TEMPERATURE: 99 F | HEART RATE: 87 BPM | RESPIRATION RATE: 22 BRPM | DIASTOLIC BLOOD PRESSURE: 95 MMHG

## 2022-09-23 VITALS
RESPIRATION RATE: 22 BRPM | HEART RATE: 87 BPM | DIASTOLIC BLOOD PRESSURE: 95 MMHG | WEIGHT: 179 LBS | OXYGEN SATURATION: 96 % | TEMPERATURE: 98.1 F | BODY MASS INDEX: 29.82 KG/M2 | SYSTOLIC BLOOD PRESSURE: 146 MMHG | HEIGHT: 65 IN

## 2022-09-23 DIAGNOSIS — T14.90XA SOFT TISSUE INJURY: Primary | ICD-10-CM

## 2022-09-23 DIAGNOSIS — I25.10 CORONARY ARTERY DISEASE INVOLVING NATIVE CORONARY ARTERY OF NATIVE HEART WITHOUT ANGINA PECTORIS: ICD-10-CM

## 2022-09-23 DIAGNOSIS — E66.3 OVERWEIGHT: ICD-10-CM

## 2022-09-23 DIAGNOSIS — I10 BENIGN ESSENTIAL HYPERTENSION: Primary | ICD-10-CM

## 2022-09-23 DIAGNOSIS — I10 PRIMARY HYPERTENSION: ICD-10-CM

## 2022-09-23 DIAGNOSIS — Z59.9 FINANCIAL PROBLEMS: ICD-10-CM

## 2022-09-23 DIAGNOSIS — Z78.9 ALCOHOL USE: ICD-10-CM

## 2022-09-23 DIAGNOSIS — Z59.71 DOES NOT HAVE HEALTH INSURANCE: ICD-10-CM

## 2022-09-23 DIAGNOSIS — L40.9 PSORIASIS: ICD-10-CM

## 2022-09-23 LAB
ALBUMIN UR-MCNC: 30 MG/DL
ANION GAP SERPL CALCULATED.3IONS-SCNC: 14 MMOL/L (ref 3–14)
APPEARANCE UR: CLEAR
BACTERIA #/AREA URNS HPF: ABNORMAL /HPF
BILIRUB UR QL STRIP: NEGATIVE
BUN SERPL-MCNC: 21 MG/DL (ref 7–30)
CALCIUM SERPL-MCNC: 10 MG/DL (ref 8.5–10.1)
CHLORIDE BLD-SCNC: 100 MMOL/L (ref 94–109)
CHOLEST SERPL-MCNC: 194 MG/DL
CO2 SERPL-SCNC: 27 MMOL/L (ref 20–32)
COLOR UR AUTO: YELLOW
CREAT SERPL-MCNC: 1.1 MG/DL (ref 0.66–1.25)
GFR SERPL CREATININE-BSD FRML MDRD: 81 ML/MIN/1.73M2
GLUCOSE BLD-MCNC: 89 MG/DL (ref 70–99)
GLUCOSE UR STRIP-MCNC: NEGATIVE MG/DL
HDLC SERPL-MCNC: 52 MG/DL
HGB UR QL STRIP: NEGATIVE
KETONES UR STRIP-MCNC: NEGATIVE MG/DL
LDLC SERPL CALC-MCNC: 118 MG/DL
LEUKOCYTE ESTERASE UR QL STRIP: NEGATIVE
NITRATE UR QL: NEGATIVE
NONHDLC SERPL-MCNC: 142 MG/DL
PH UR STRIP: 5.5 [PH] (ref 5–7)
POTASSIUM BLD-SCNC: 4.9 MMOL/L (ref 3.4–5.3)
RBC #/AREA URNS AUTO: ABNORMAL /HPF
SODIUM SERPL-SCNC: 141 MMOL/L (ref 133–144)
SP GR UR STRIP: >=1.03 (ref 1–1.03)
SPERM #/AREA URNS HPF: PRESENT /HPF
TRIGL SERPL-MCNC: 121 MG/DL
UROBILINOGEN UR STRIP-ACNC: 0.2 E.U./DL
WBC #/AREA URNS AUTO: ABNORMAL /HPF

## 2022-09-23 PROCEDURE — 99214 OFFICE O/P EST MOD 30 MIN: CPT | Mod: GC | Performed by: STUDENT IN AN ORGANIZED HEALTH CARE EDUCATION/TRAINING PROGRAM

## 2022-09-23 PROCEDURE — 81001 URINALYSIS AUTO W/SCOPE: CPT | Performed by: STUDENT IN AN ORGANIZED HEALTH CARE EDUCATION/TRAINING PROGRAM

## 2022-09-23 PROCEDURE — 80061 LIPID PANEL: CPT | Performed by: STUDENT IN AN ORGANIZED HEALTH CARE EDUCATION/TRAINING PROGRAM

## 2022-09-23 PROCEDURE — 36415 COLL VENOUS BLD VENIPUNCTURE: CPT | Performed by: STUDENT IN AN ORGANIZED HEALTH CARE EDUCATION/TRAINING PROGRAM

## 2022-09-23 PROCEDURE — 99213 OFFICE O/P EST LOW 20 MIN: CPT | Mod: GC | Performed by: STUDENT IN AN ORGANIZED HEALTH CARE EDUCATION/TRAINING PROGRAM

## 2022-09-23 PROCEDURE — 80048 BASIC METABOLIC PNL TOTAL CA: CPT | Performed by: STUDENT IN AN ORGANIZED HEALTH CARE EDUCATION/TRAINING PROGRAM

## 2022-09-23 RX ORDER — ATORVASTATIN CALCIUM 40 MG/1
40 TABLET, FILM COATED ORAL DAILY
Qty: 90 TABLET | Refills: 3 | Status: SHIPPED | OUTPATIENT
Start: 2022-09-23 | End: 2022-09-23

## 2022-09-23 RX ORDER — LOSARTAN POTASSIUM 25 MG/1
25 TABLET ORAL DAILY
Qty: 90 TABLET | Refills: 0 | Status: CANCELLED | OUTPATIENT
Start: 2022-09-23

## 2022-09-23 RX ORDER — LOSARTAN POTASSIUM 25 MG/1
25 TABLET ORAL DAILY
Qty: 90 TABLET | Refills: 0 | Status: SHIPPED | OUTPATIENT
Start: 2022-09-23 | End: 2022-09-23

## 2022-09-23 RX ORDER — ATORVASTATIN CALCIUM 40 MG/1
40 TABLET, FILM COATED ORAL DAILY
Qty: 90 TABLET | Refills: 3 | Status: SHIPPED | OUTPATIENT
Start: 2022-09-23

## 2022-09-23 RX ORDER — LOSARTAN POTASSIUM 25 MG/1
25 TABLET ORAL DAILY
Qty: 90 TABLET | Refills: 0 | Status: SHIPPED | OUTPATIENT
Start: 2022-09-23

## 2022-09-23 SDOH — ECONOMIC STABILITY - INCOME SECURITY: PROBLEM RELATED TO HOUSING AND ECONOMIC CIRCUMSTANCES, UNSPECIFIED: Z59.9
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
## 2022-09-23 NOTE — LETTER
September 26, 2022      Dyllan Quintanilla  [address] [place] [address]    Mr. Quintanilla,    Here are your results from your recent visit.     Your cholesterol levels could be a bit better, we talked about this last visit and the lipitor should help with this. Your urine had some protein in it, thus we want to make sure we're being aggressive regarding the treatment of your blood pressure.     We'll talk more at your visit.     Resulted Orders   Basic metabolic panel   Result Value Ref Range    Sodium 141 133 - 144 mmol/L    Potassium 4.9 3.4 - 5.3 mmol/L    Chloride 100 94 - 109 mmol/L    Carbon Dioxide (CO2) 27 20 - 32 mmol/L    Anion Gap 14 3 - 14 mmol/L    Urea Nitrogen 21 7 - 30 mg/dL    Creatinine 1.10 0.66 - 1.25 mg/dL    Calcium 10.0 8.5 - 10.1 mg/dL    Glucose 89 70 - 99 mg/dL    GFR Estimate 81 >60 mL/min/1.73m2      Comment:      Effective December 21, 2021 eGFRcr in adults is calculated using the 2021 CKD-EPI creatinine equation which includes age and gender (Garfield nichols al., NEJ, DOI: 10.1056/QZDYwe5156990)   Lipid Profile   Result Value Ref Range    Cholesterol 194 <200 mg/dL    Triglycerides 121 <150 mg/dL    Direct Measure HDL 52 >=40 mg/dL    LDL Cholesterol Calculated 118 (H) <=100 mg/dL    Non HDL Cholesterol 142 (H) <130 mg/dL    Narrative    Cholesterol  Desirable:  <200 mg/dL    Triglycerides  Normal:  Less than 150 mg/dL  Borderline High:  150-199 mg/dL  High:  200-499 mg/dL  Very High:  Greater than or equal to 500 mg/dL    Direct Measure HDL  Female:  Greater than or equal to 50 mg/dL   Male:  Greater than or equal to 40 mg/dL    LDL Cholesterol  Desirable:  <100mg/dL  Above Desirable:  100-129 mg/dL   Borderline High:  130-159 mg/dL   High:  160-189 mg/dL   Very High:  >= 190 mg/dL    Non HDL Cholesterol  Desirable:  130 mg/dL  Above Desirable:  130-159 mg/dL  Borderline High:  160-189 mg/dL  High:  190-219 mg/dL  Very High:  Greater than or equal to 220 mg/dL   Urinalysis, Micro If   Result  Value Ref Range    Color Urine Yellow Colorless, Straw, Light Yellow, Yellow    Appearance Urine Clear Clear    Glucose Urine Negative Negative mg/dL    Bilirubin Urine Negative Negative    Ketones Urine Negative Negative mg/dL    Specific Gravity Urine >=1.030 1.003 - 1.035    Blood Urine Negative Negative    pH Urine 5.5 5.0 - 7.0    Protein Albumin Urine 30  (A) Negative mg/dL    Urobilinogen Urine 0.2 0.2, 1.0 E.U./dL    Nitrite Urine Negative Negative    Leukocyte Esterase Urine Negative Negative   Urine Microscopic Exam   Result Value Ref Range    Bacteria Urine Moderate (A) None Seen /HPF    RBC Urine None Seen 0-2 /HPF /HPF    WBC Urine None Seen 0-5 /HPF /HPF    Sperm Urine Present (A) None Seen /HPF       If you have any questions or concerns, please call the clinic at the number listed above.       Sincerely,      Elpidio Catsillo MD

## 2022-09-23 NOTE — LETTER
WORK FORM    9/23/2022    Re: Dyllan Quintanilla  1970      To Whom It May Concern:     Dyllan Quintanilla was seen in clinic today. Patient cannot return to work for at least another 2 weeks when patient will then be reassessed (visit on 10/10/22) and further determination made regarding work status.            Elpidio Castillo MD  9/23/2022 1:56 PM

## 2022-09-23 NOTE — PROGRESS NOTES
Preceptor Attestation:  Patient's case reviewed and discussed with the resident, Elpidio Castillo MD, and I personally evaluated the patient. I agree with written assessment and plan of care.    Supervising Physician:  Maryann Umanzor MD   [facility]

## 2022-09-23 NOTE — PATIENT INSTRUCTIONS
DASH - Dietary Approaches to Stop Hypertension    Diet overview: Designed to treat or prevent HTN without medication using a diet low in saturated fat, cholesterol, and total fat and emphasizing vegetables, fruits, and low-fat dairy products that are naturally low in sodium; as well as limited amounts of whole grains, legumes, poultry, fish, and nuts. Also allows for small amounts of red meat. Rich in potassium, magnesium, calcium, and protein.    Standard DASH: limiting salt to 2,300 mg/day  Lower sodium DASH: limiting salt to 1,500 mg/day, may have a greater effect on blood pressure with the combined approach of limiting salt and using a DASH diet.    Diet Example:   4-5 servings/day of fruits and vegetables   6-8 servings/day of grains  2-3 servings of low-fat dairy  6 or fewer 1oz servings/day of meat, poultry, fish   4-5 servings/week nuts, seeds, legumes  2-3 servings/day of unsaturated fats and oils:   5 servings/week of sweets    Effect on health outcome:  CV Health:  Lowers systolic and diastolic blood pressure and LDL cholesterol. This helps reduce risk and prevent cardiovascular disease including cardiovascular mortality, heart attack, and stroke. Reduces risk of heart failure in women.    Diabetes:   Reduces the amount of sugary, processed foods  Reduces cardiovascular risk factors, which includes diabetes  Reduced risk of being overweight in children 12 years of age    Weight loss/maintenance   May experience weight loss, although not the goal as it still includes about 2,000 calories a day. May have to reduce daily calories further for weight loss goals.    Diet Pros:   Proven to lower bp and reduce CV risk factors and prevent heart attack, stroke,   Reduces need for medications  Reduced blood pressure in as little as 2 weeks  Greater reduction in blood pressure with higher blood pressure    Diet Cons:   Navigating food labels can be difficult  Access to healthy food options i.e. food deserts, can be  expensive    Where to get more info:  [facility], [email or web address]  American Heart Association, heart.org  National Heart, Lung, and Blood [place], nhlbi.nih.gov  American Academy of Family Physicians, familydoctor.org        ------------------------------------------------  Mediterranean diet-Plant based    Diet overview: Largely plant-based using minimally processed fruits, vegetables, bread, seeds, beans, nuts. Olive oil is used as the primary source of fats. Avoidance of red meats, white-flour products, and sweets. Using herbs to enhance flavor instead of salt or fat. Mediterranean countries had fewer deaths related to coronary heart disease because their diet reduces CVD risk factors.    Diet Example:  Fresh fruits: 3 servings/day (1 serving =   -1 cup)  Vegetables: over 3 servings/day (1 serving =   -1 cup)  Legumes (beans and lentils): 3 servings/week  Extra virgin olive oil: at least 1 Tbsp/day (no more than 4 Tbsp)  Fish: 3 servings/week (1 serving = 3-4 oz)  Nuts: At least 3 servings/week (1 serving = 1 oz or   cup)  Whole grains: 3-6 servings/day (1 serving =   cup cooked, 1 slice of bread, 1 oz dry cereal)  Poultry (white meat)  Dairy: fat-free or low-fat  Eggs: Limit egg yolks, 1/day  No red meat or no more than 1 serving /week (1 serving = 3 oz)  Avoid baked goods, sweets, or deserts  Red wine in moderation    Effect on health outcome:  CV Health:   may reduce risk for  heart attack, pulmonary embolism, stroke, and death related to heart problems.  may improve non-LDL cholesterol levels: increasing HDL and lowering Triglycerides    Diabetes   Reduces risk for metabolic syndrome (cluster of conditions including high blood pressure, high blood sugar, increased waist circumference, and abnormal cholesterol levels)   Metabolic syndrome can increase risk of stroke and diabetes  May reduce HbA1c and weight in patients with type 2 diabetes   Maybe even more than low-fat diet    Weight loss/maintenance    Associated with increased weight loss and lower BMI   Similar weight loss at 5 years to low-fat diets in patients with high risk CVD  Low carb Mediterranean diet may increase weight loss compared to other diets recommended to patients with diabetes    Diet Pros:   -Can be used long-term and integrate into any diet   -Allows for more calories from healthy fats like olive oil   -May protect against chronic conditions like cancer, Alzheimer s, and Parkinson    Diet Cons:  -Iron deficient patients should ensure they consume enough dietary irons (spinach) with vitamin C (strawberries).   -Will reduce calcium intake due to less dairy product consumption    Where to get more info:  [facility], [email or web address]  [facility], [email or web address]  American Academy of Family Physicians, familydoctor.org  American Heart Association, heart.org  National Institutes of Health MedlinePlus, medlineplus.gov

## 2022-09-25 RX ORDER — BETAMETHASONE DIPROPIONATE 0.5 MG/G
CREAM TOPICAL DAILY PRN
Qty: 15 G | Refills: 1 | Status: SHIPPED | OUTPATIENT
Start: 2022-09-25 | End: 2022-10-10

## 2022-09-26 ENCOUNTER — TELEPHONE (OUTPATIENT)
Dept: PHYSICAL THERAPY | Facility: REHABILITATION | Age: 52
End: 2022-09-26

## 2022-09-26 NOTE — TELEPHONE ENCOUNTER
Called pt and left message that we had an appt for him today, and that he should call our main desk with questions or appt question.

## 2022-09-27 ENCOUNTER — HOSPITAL ENCOUNTER (OUTPATIENT)
Dept: PHYSICAL THERAPY | Facility: REHABILITATION | Age: 52
Discharge: HOME | End: 2022-09-27
Payer: OTHER MISCELLANEOUS

## 2022-09-27 DIAGNOSIS — S99.911D INJURY OF RIGHT ANKLE, SUBSEQUENT ENCOUNTER: ICD-10-CM

## 2022-09-27 DIAGNOSIS — S99.912D INJURY OF LEFT ANKLE, SUBSEQUENT ENCOUNTER: Primary | ICD-10-CM

## 2022-09-27 PROCEDURE — 97110 THERAPEUTIC EXERCISES: CPT | Mod: GP | Performed by: PHYSICAL THERAPIST

## 2022-09-28 ENCOUNTER — PATIENT OUTREACH (OUTPATIENT)
Dept: CARE COORDINATION | Facility: CLINIC | Age: 52
End: 2022-09-28

## 2022-09-28 ENCOUNTER — DOCUMENTATION ONLY (OUTPATIENT)
Dept: CARE COORDINATION | Facility: CLINIC | Age: 52
End: 2022-09-28

## 2022-09-28 DIAGNOSIS — Z59.71 DOES NOT HAVE HEALTH INSURANCE: Primary | ICD-10-CM

## 2022-09-28 NOTE — PROGRESS NOTES
Clinic Care Coordination Contact  Care Team Conversations    SW referred patient to [facility] FRW team this date to discuss options available to patient for health insurance. Patient referred to SW by PCP due to lack of health insurance. FRW to reach out to patient in next business day.    JEWELS SARGENT, DAVID, LADC

## 2022-09-28 NOTE — PROGRESS NOTES
Clinic Care Coordination Contact  Program: Health Insurance   County:  [facility] Case #:  [facility] Worker:   Salma #:   Subscriber #:   Renewal:  Date Applied:     FRW Outreach:   9/28/22: Outreach attempted x 1. Left message on voicemail with call back information and requested return call.  Plan: FRW will call again within one week.     Health Insurance:      Referral/Screening:

## 2022-10-04 ENCOUNTER — HOSPITAL ENCOUNTER (OUTPATIENT)
Dept: PHYSICAL THERAPY | Facility: REHABILITATION | Age: 52
Discharge: HOME | End: 2022-10-04
Payer: OTHER MISCELLANEOUS

## 2022-10-04 DIAGNOSIS — S99.912D INJURY OF LEFT ANKLE, SUBSEQUENT ENCOUNTER: ICD-10-CM

## 2022-10-04 DIAGNOSIS — M62.81 GENERALIZED MUSCLE WEAKNESS: Primary | ICD-10-CM

## 2022-10-04 DIAGNOSIS — S99.911D INJURY OF RIGHT ANKLE, SUBSEQUENT ENCOUNTER: ICD-10-CM

## 2022-10-04 PROCEDURE — 97112 NEUROMUSCULAR REEDUCATION: CPT | Mod: GP | Performed by: PHYSICAL THERAPY ASSISTANT

## 2022-10-04 PROCEDURE — 97110 THERAPEUTIC EXERCISES: CPT | Mod: GP | Performed by: PHYSICAL THERAPY ASSISTANT

## 2022-10-05 ENCOUNTER — PATIENT OUTREACH (OUTPATIENT)
Dept: CARE COORDINATION | Facility: CLINIC | Age: 52
End: 2022-10-05

## 2022-10-05 NOTE — PROGRESS NOTES
Clinic Care Coordination Contact  Program: Health Insurance   County:  [facility] Case #:  [facility] Worker:   Salma #:   Subscriber #:   Renewal:  Date Applied:     FRW Outreach:   10/5/22: FRW called pt for attempt x 2. FRW left VM and will make 3rd attempt in 2 weeks.   9/28/22: Outreach attempted x 1. Left message on voicemail with call back information and requested return call.  Plan: FRW will call again within one week.     Health Insurance:      Referral/Screening:

## 2022-10-10 ENCOUNTER — OFFICE VISIT (OUTPATIENT)
Dept: FAMILY MEDICINE | Facility: CLINIC | Age: 52
End: 2022-10-10

## 2022-10-10 ENCOUNTER — OFFICE VISIT (OUTPATIENT)
Dept: FAMILY MEDICINE | Facility: CLINIC | Age: 52
End: 2022-10-10
Payer: OTHER MISCELLANEOUS

## 2022-10-10 VITALS
SYSTOLIC BLOOD PRESSURE: 139 MMHG | HEIGHT: 66 IN | DIASTOLIC BLOOD PRESSURE: 89 MMHG | WEIGHT: 176 LBS | OXYGEN SATURATION: 100 % | BODY MASS INDEX: 28.28 KG/M2 | TEMPERATURE: 98.1 F | RESPIRATION RATE: 18 BRPM | HEART RATE: 76 BPM

## 2022-10-10 DIAGNOSIS — E66.3 OVERWEIGHT (BMI 25.0-29.9): ICD-10-CM

## 2022-10-10 DIAGNOSIS — T14.90XA SOFT TISSUE INJURY: Primary | ICD-10-CM

## 2022-10-10 DIAGNOSIS — L40.9 PSORIASIS: ICD-10-CM

## 2022-10-10 DIAGNOSIS — I10 BENIGN ESSENTIAL HYPERTENSION: Primary | ICD-10-CM

## 2022-10-10 LAB
ANION GAP SERPL CALCULATED.3IONS-SCNC: 13 MMOL/L (ref 3–14)
BUN SERPL-MCNC: 14 MG/DL (ref 7–30)
CALCIUM SERPL-MCNC: 9.7 MG/DL (ref 8.5–10.1)
CHLORIDE BLD-SCNC: 100 MMOL/L (ref 94–109)
CO2 SERPL-SCNC: 27 MMOL/L (ref 20–32)
CREAT SERPL-MCNC: 0.8 MG/DL (ref 0.66–1.25)
GFR SERPL CREATININE-BSD FRML MDRD: >90 ML/MIN/1.73M2
GLUCOSE BLD-MCNC: 87 MG/DL (ref 70–99)
POTASSIUM BLD-SCNC: 4.2 MMOL/L (ref 3.4–5.3)
SODIUM SERPL-SCNC: 140 MMOL/L (ref 133–144)

## 2022-10-10 PROCEDURE — 99213 OFFICE O/P EST LOW 20 MIN: CPT | Mod: GC | Performed by: STUDENT IN AN ORGANIZED HEALTH CARE EDUCATION/TRAINING PROGRAM

## 2022-10-10 PROCEDURE — 80048 BASIC METABOLIC PNL TOTAL CA: CPT | Performed by: STUDENT IN AN ORGANIZED HEALTH CARE EDUCATION/TRAINING PROGRAM

## 2022-10-10 PROCEDURE — 36415 COLL VENOUS BLD VENIPUNCTURE: CPT | Performed by: STUDENT IN AN ORGANIZED HEALTH CARE EDUCATION/TRAINING PROGRAM

## 2022-10-10 PROCEDURE — 99214 OFFICE O/P EST MOD 30 MIN: CPT | Mod: GC | Performed by: STUDENT IN AN ORGANIZED HEALTH CARE EDUCATION/TRAINING PROGRAM

## 2022-10-10 RX ORDER — BETAMETHASONE DIPROPIONATE 0.5 MG/G
CREAM TOPICAL DAILY PRN
Qty: 15 G | Refills: 1 | Status: SHIPPED | OUTPATIENT
Start: 2022-10-10 | End: 2023-08-04

## 2022-10-10 RX ORDER — BETAMETHASONE DIPROPIONATE 0.5 MG/G
CREAM TOPICAL DAILY PRN
Qty: 15 G | Refills: 1 | Status: SHIPPED | OUTPATIENT
Start: 2022-10-10 | End: 2022-10-10

## 2022-10-10 NOTE — LETTER
October 10, 2022      Dyllan ALISON Dwight  [address] [place] [address]        Dear ,    We are writing to inform you of your test results.    Your repeat blood tests were normal.   Let me know if you have any other questions or concerns.     Resulted Orders   Basic metabolic panel   Result Value Ref Range    Sodium 140 133 - 144 mmol/L    Potassium 4.2 3.4 - 5.3 mmol/L    Chloride 100 94 - 109 mmol/L    Carbon Dioxide (CO2) 27 20 - 32 mmol/L    Anion Gap 13 3 - 14 mmol/L    Urea Nitrogen 14 7 - 30 mg/dL    Creatinine 0.80 0.66 - 1.25 mg/dL    Calcium 9.7 8.5 - 10.1 mg/dL    Glucose 87 70 - 99 mg/dL    GFR Estimate >90 >60 mL/min/1.73m2      Comment:      Effective December 21, 2021 eGFRcr in adults is calculated using the 2021 CKD-EPI creatinine equation which includes age and gender (Garfield et al., NEJM, DOI: 10.1056/AVSCfd6858421)       If you have any questions or concerns, please call the clinic at the number listed above.       Sincerely,    Elpidio Castillo MD

## 2022-10-10 NOTE — LETTER
October 10, 2022      Dyllanjluis Quintanilla  [address] [place] [address]        Dear ,    We are writing to inform you of your test results.    {results letter list:[id number]}    Resulted Orders   Basic metabolic panel   Result Value Ref Range    Sodium 140 133 - 144 mmol/L    Potassium 4.2 3.4 - 5.3 mmol/L    Chloride 100 94 - 109 mmol/L    Carbon Dioxide (CO2) 27 20 - 32 mmol/L    Anion Gap 13 3 - 14 mmol/L    Urea Nitrogen 14 7 - 30 mg/dL    Creatinine 0.80 0.66 - 1.25 mg/dL    Calcium 9.7 8.5 - 10.1 mg/dL    Glucose 87 70 - 99 mg/dL    GFR Estimate >90 >60 mL/min/1.73m2      Comment:      Effective December 21, 2021 eGFRcr in adults is calculated using the 2021 CKD-EPI creatinine equation which includes age and gender (Garfield et al., NEJM, DOI: 10.1056/DACOft5465493)       If you have any questions or concerns, please call the clinic at the number listed above.       Sincerely,      Jose Cope

## 2022-10-10 NOTE — PROGRESS NOTES
Assessment and Plan     (I10) Benign essential hypertension  (primary encounter diagnosis)  Comment: /89 today. Got BP monitor but not yet checking at home. Taking losartan 25mg daily since last visit.   Plan:   -Basic metabolic panel  -Continue losartan 25mg daily  -Counseled to keep BP log and discussed how to check BP  -Follow-up in 3 months with lipid recheck at that time    (L40.9) Psoriasis  Comment: Psoriasis over knees/elbows. Discussed prior but never got diprosone. Wants sent to [facility] so he can try goodrx there.  Plan:   -betamethasone dipropionate (DIPROSONE) 0.05 % external cream  -Counseled on emollients    (E66.3) Overweight (BMI 25.0-29.9)  Comment: BMI 28.41. Has lost a couple pounds purposely. Working on diet.  Plan:   -Counseled on lifestyle modifications      Options for treatment and follow-up care were reviewed with the patient and/or guardian. Dyllan Quintanilla and/or guardian engaged in the decision making process and verbalized understanding of the options discussed and agreed with the final plan.    Elpidio Castillo MD      Precepted today with: Jose Cope MD           HPI:       Dyllan Quintanilla is a 52 year old  male with pertinent hx of HTN, CAD without cardiac event, alcohol use, psoriasis who presents for BP follow-up:    Last visit 2 weeks ago:  (I10) Benign essential hypertension  (primary encounter diagnosis)  Comment: /95 today. Has monitor at home and is going to start checking now. Has been on lisinopril in the past but hasn't taken for over a year. Discussed options today and patient wants losartan.  Plan:   -Basic metabolic panel  -Lipid Profile  -Urinalysis, Micro If, Urine Microscopic Exam  -Start losartan (COZAAR) 25 MG tablet daily  -Counseled on lifestyle modifications  -Counseled on keeping BP log and how to correctly check BP at home  -Follow-up in 2 weeks for HTN visit and repeat/follow-up BMP     (I25.10) Coronary artery disease involving native coronary artery of  native heart without angina pectoris  Comment: Inducible ischemia at inferior and lateral regions on exercise EKG stress test in 2019. Followed with cards (Dr Lindsay) for a visit and was recommended to follow-up in 1 year and start lipitor and lisinopril. Unfortunately never saw cards again and didn't continue meds. Denies any angina-like sx within the last year.  Plan:   -Basic metabolic panel  -Lipid Profile  -Atorvastatin (LIPITOR) 40 MG tablet daily  -Losartan (COZAAR) 25 MG tablet daily  -Recommend repeating lipids in 3-6 months given starting lipitor today       -Recommend follow-up with cards     (E66.3) Overweight  Comment: BMI 28.89 today in setting of HTN and CAD. Fairly active awais. Gets a fair amount of calories from alcohol.  Plan:  -Counseled on lifestyle modifications   -Counseled on alcohol reduction     (Z72.89) Alcohol use  Comment: Drinking a case of alcohol a week (usually ~20 beers). No h/o withdrawal and doesn't feel like he's addicted or that it causes him issues but does understand now that he should cut back for the sake of his health. Does not want any meds for this.  Plan:  -Counseled on alcohol reduction     (Z59.9) Financial problems  (Z59.89) Does not have health insurance  Comment: Currently getting work comp for significant injury to LEs. Won't be able to work for at least the next 2 weeks. Doesn't have health insurance right now- opted prior to not get insurance through his work.   Plan:   -Message to      (L40.9) Psoriasis  Comment: Thickened plaque over b/l anterior knees and some plaque over R arm. Uses vaseline at home which helps.  Plan:   -Continue emollient tx  -Start betamethasone daily as needed, counseled on use    Todays visit:  Taking lipitor and losartan as prescribed.  Just got home BP monitor; hasn't started checking yet.  Has purposely lost 2 lbs over last few weeks; working on lifestyle mods.         PMHX:     Patient Active Problem List   Diagnosis     Benign  "essential hypertension     Soft tissue injury of b/l LEs       Current Outpatient Medications   Medication Sig Dispense Refill     betamethasone dipropionate (DIPROSONE) 0.05 % external cream Apply topically daily as needed (For psoriasis plaques) 15 g 1     atorvastatin (LIPITOR) 40 MG tablet Take 1 tablet (40 mg) by mouth daily 90 tablet 3     ibuprofen (ADVIL/MOTRIN) 600 MG tablet Take 1 tablet (600 mg) by mouth every 8 hours as needed for moderate pain (Patient not taking: No sig reported) 20 tablet 0     losartan (COZAAR) 25 MG tablet Take 1 tablet (25 mg) by mouth daily 90 tablet 0       Social History     Tobacco Use     Smoking status: Never     Smokeless tobacco: Never     Tobacco comments:     Girlfriend smokes but outside   Substance Use Topics     Alcohol use: Yes     Alcohol/week: 12.0 standard drinks     Drug use: Not Currently          Allergies   Allergen Reactions     No Known Allergies        No results found for this or any previous visit (from the past 24 hour(s)).         Review of Systems:     10 point ROS negative except for what is noted in HPI          Physical Exam:     Vitals:    10/10/22 0943 10/10/22 0945   BP: (!) 149/95 139/89   Pulse: 76    Resp: 18    Temp: 98.1  F (36.7  C)    SpO2: 100%    Weight: 79.8 kg (176 lb)    Height: 1.676 m (5' 6\")      Body mass index is 28.41 kg/m .    General: Sitting comfortably. No acute distress.   HEENT: Conjunctivae are clear, nonicteric.   Neck: No masses appreciated.  Respiratory: No respiratory distress. Lung sounds are clear without rales, ronchi, or wheezes.   Cardiac: RRR. No m/g/r. Brisk cap refill.  Abdominal: Abdomen is overweight, soft and non-tender without distention.  Extremities: B/L nonpitting swelling of ankles, improved since last exam. Improving ROM but still limited throughout all ankle movements.  Skin: Skin in warm without rashes.  Neurological: Slow gait favoring ankles.  Psychiatric: Good insight and reasoning.    "

## 2022-10-10 NOTE — LETTER
RETURN TO WORK FORM    10/10/2022    Re: Dyllan Quintanilla  1970      To Whom It May Concern:     Dyllan Quintanilla was seen in clinic today.  He may return to work with restrictions on 10/20/22          Restrictions: limited activities; no lifting objects >25 lbs.      Elpidio Castillo MD  10/10/2022 10:04 AM

## 2022-10-10 NOTE — LETTER
October 10, 2022      Dyllanjluis Quintanilla  [address] [place] [address]        Dear ,    We are writing to inform you of your test results.    {results letter list:[id number]}    Resulted Orders   Basic metabolic panel   Result Value Ref Range    Sodium 140 133 - 144 mmol/L    Potassium 4.2 3.4 - 5.3 mmol/L    Chloride 100 94 - 109 mmol/L    Carbon Dioxide (CO2) 27 20 - 32 mmol/L    Anion Gap 13 3 - 14 mmol/L    Urea Nitrogen 14 7 - 30 mg/dL    Creatinine 0.80 0.66 - 1.25 mg/dL    Calcium 9.7 8.5 - 10.1 mg/dL    Glucose 87 70 - 99 mg/dL    GFR Estimate >90 >60 mL/min/1.73m2      Comment:      Effective December 21, 2021 eGFRcr in adults is calculated using the 2021 CKD-EPI creatinine equation which includes age and gender (Garfield et al., NEJM, DOI: 10.1056/YNMCnx3740996)       If you have any questions or concerns, please call the clinic at the number listed above.       Sincerely,      Jose Cope

## 2022-10-10 NOTE — LETTER
RETURN TO WORK FORM    10/10/2022    Re: Dyllan Quintanilla  1970      To Whom It May Concern:     Dyllan Quintanilla was seen in clinic today..  He may return to work with restrictions on 10/20/22          Restrictions:  limited activities; no lifting objects >25 lbs. Please allow to sit for 5 minutes every hour.  Light duty restrictions : 11/10/2022      Elpidio Castillo MD  10/10/2022 10:24 AM

## 2022-10-10 NOTE — PROGRESS NOTES
Assessment and Plan     (T14.90XA) Soft tissue injury of b/l LEs  (primary encounter diagnosis)  Comment: Going to PT twice weekly still, is helping significantly but still has some edema and working on strength, pain, and balance. Exam today pertinent for improved by continued ankle swelling and some tenderness. After discussion today, think reasonable to plan to be back at work on light duty on 10/20/22 and then try to get off light duty a few weeks after. Patient to continue with PT and pain plan in the interim and to let me know if he needs adjustment of plan.  Plan:   -Note for off work till 10/20/22 then light duties till 11/10/22  -Counseled to see me back if needing adjustments  -Continue PT    Options for treatment and follow-up care were reviewed with the patient and/or guardian. Dyllan Quintanilla and/or guardian engaged in the decision making process and verbalized understanding of the options discussed and agreed with the final plan.    Elpidio Castillo MD      Precepted today with: Jose Cope MD           HPI:       Dyllan Quintanilla is a 52 year old male who presents for WC injury follow-up:    At last visit ~2 weeks ago:  (T14.90XA) Soft tissue injury of b/l LEs  (primary encounter diagnosis)  Comment: Going to PT twice weekly, is helping significantly but still lacks ROM and strength, dealing with pain and swelling. Hopeful that he'll be able to go back to week in a couple weeks. Just using tylenol and ibuprofen a couple times a day; avoids ibuprofen as able. Definitely think he needs at least 2 more weeks off before he can adequately and safely return to work.  Plan:   -Follow-up in 2 weeks on 10/10/22 for me for reassessment; work note until then     Todays visit:  Overall improving with PT.  Swelling improving.   Still working on balance, pain, strength.  Wants to get back to work but admits he can't do what his work needs him to do. Wants to at least get back for light duty.         PMHX:     Patient  Active Problem List   Diagnosis     Benign essential hypertension     Soft tissue injury of b/l LEs       Current Outpatient Medications   Medication Sig Dispense Refill     atorvastatin (LIPITOR) 40 MG tablet Take 1 tablet (40 mg) by mouth daily 90 tablet 3     betamethasone dipropionate (DIPROSONE) 0.05 % external cream Apply topically daily as needed (For psoriasis plaques) 15 g 1     ibuprofen (ADVIL/MOTRIN) 600 MG tablet Take 1 tablet (600 mg) by mouth every 8 hours as needed for moderate pain (Patient not taking: No sig reported) 20 tablet 0     losartan (COZAAR) 25 MG tablet Take 1 tablet (25 mg) by mouth daily 90 tablet 0       Social History     Tobacco Use     Smoking status: Never     Smokeless tobacco: Never     Tobacco comments:     Girlfriend smokes but outside   Substance Use Topics     Alcohol use: Yes     Alcohol/week: 12.0 standard drinks     Drug use: Not Currently          Allergies   Allergen Reactions     No Known Allergies        No results found for this or any previous visit (from the past 24 hour(s)).         Review of Systems:     10 point ROS negative except for what is noted in HPI          Physical Exam:     There were no vitals filed for this visit.  There is no height or weight on file to calculate BMI.    General: Sitting comfortably. No acute distress.   Respiratory: No respiratory distress. Lung sounds are clear without rales, ronchi, or wheezes.   Abdominal: Abdomen is overweight, soft and non-tender without distention.  Extremities: B/L nonpitting swelling of ankles, improved since last exam. Improving ROM but still limited throughout all ankle movements.  Skin: Skin in warm without rashes or bruising.  Neurological: Motor function is grossly normal. Gait bit slow, favoring ankles.  Psychiatric: Good insight and judgement.

## 2022-10-10 NOTE — LETTER
October 10, 2022      Dyllanjluis Quintanilla  [address] [place] [address]        Dear ,    We are writing to inform you of your test results.    {results letter list:[id number]}    Resulted Orders   Basic metabolic panel   Result Value Ref Range    Sodium 140 133 - 144 mmol/L    Potassium 4.2 3.4 - 5.3 mmol/L    Chloride 100 94 - 109 mmol/L    Carbon Dioxide (CO2) 27 20 - 32 mmol/L    Anion Gap 13 3 - 14 mmol/L    Urea Nitrogen 14 7 - 30 mg/dL    Creatinine 0.80 0.66 - 1.25 mg/dL    Calcium 9.7 8.5 - 10.1 mg/dL    Glucose 87 70 - 99 mg/dL    GFR Estimate >90 >60 mL/min/1.73m2      Comment:      Effective December 21, 2021 eGFRcr in adults is calculated using the 2021 CKD-EPI creatinine equation which includes age and gender (Garfield et al., NEJM, DOI: 10.1056/[id number])       If you have any questions or concerns, please call the clinic at the number listed above.       Sincerely,      Jose Cope

## 2022-10-11 NOTE — NURSING NOTE
Note/letter written for patient faxed to patient employer [employer] on 10/11/22 to fax number [phone]. [place]

## 2022-10-20 ENCOUNTER — PATIENT OUTREACH (OUTPATIENT)
Dept: CARE COORDINATION | Facility: CLINIC | Age: 52
End: 2022-10-20

## 2022-10-20 NOTE — PROGRESS NOTES
Clinic Care Coordination Contact  Program: Health Insurance   County:  [facility] Case #:  [facility] Worker:   Salma #:   Subscriber #:   Renewal:  Date Applied:     FRW Outreach:   10/20/22: FRW called pt for 3rd attempt and could not leave VM. Routing to CC team for failed attempts. Please send new referral in future.   10/5/22: FRW called pt for attempt x 2. FRW left VM and will make 3rd attempt in 2 weeks.   9/28/22: Outreach attempted x 1. Left message on voicemail with call back information and requested return call.  Plan: FRW will call again within one week.     Health Insurance:      Referral/Screening:

## 2022-10-28 ENCOUNTER — HOSPITAL ENCOUNTER (OUTPATIENT)
Dept: PHYSICAL THERAPY | Facility: REHABILITATION | Age: 52
Discharge: HOME | End: 2022-10-28
Payer: OTHER MISCELLANEOUS

## 2022-10-28 PROCEDURE — 97110 THERAPEUTIC EXERCISES: CPT | Mod: GP

## 2022-10-28 PROCEDURE — 97112 NEUROMUSCULAR REEDUCATION: CPT | Mod: GP

## 2022-11-18 ENCOUNTER — HOSPITAL ENCOUNTER (OUTPATIENT)
Dept: PHYSICAL THERAPY | Facility: REHABILITATION | Age: 52
Discharge: HOME | End: 2022-11-18

## 2022-11-18 PROCEDURE — 97140 MANUAL THERAPY 1/> REGIONS: CPT | Mod: GP

## 2022-11-18 PROCEDURE — 97110 THERAPEUTIC EXERCISES: CPT | Mod: GP

## 2022-12-06 ENCOUNTER — TELEPHONE (OUTPATIENT)
Dept: PHYSICAL THERAPY | Facility: REHABILITATION | Age: 52
End: 2022-12-06

## 2022-12-06 NOTE — TELEPHONE ENCOUNTER
Contacted patient about missed appointment, left voicemail requesting call back to confirm next follow up appointment.

## 2022-12-16 ENCOUNTER — TELEPHONE (OUTPATIENT)
Dept: PHYSICAL THERAPY | Facility: REHABILITATION | Age: 52
End: 2022-12-16

## 2022-12-16 NOTE — TELEPHONE ENCOUNTER
Contacted pt about missed appointment, left voicemail regarding no show and cancellation policy. Stated all appointments for future would be cancelled and if pt wishes to return for PT would need referral to be sent over from doctor.

## 2022-12-19 NOTE — ADDENDUM NOTE
Encounter addended by: Andressa Lee, PT on: 12/19/2022 7:49 AM   Actions taken: Clinical Note Signed, Flowsheet accepted, Episode resolved

## 2022-12-19 NOTE — PROGRESS NOTES
[facility]    Outpatient Physical Therapy Discharge Note  Patient: Dyllan Quintanilla  : 1970    Beginning/End Dates of Reporting Period:  22 to 10/28/22    Referring Provider: Elpidio Castillo MD    Therapy Diagnosis: Right and left ankle injuries 22     Client Self Report: Pt arrives today reporting increased ankle pain and continuing to work 10 hour shifts and trying to perform full work demands. Has shooting pains during and end of shift and needs to sit down to alleviate pain. Reports has not been able to consistently perform HEP secondary to just feeling tired with work. Very tired and sore today       22 1400   Signing Clinician's Name / Credentials   Signing clinician's name / credentials Shivani Lee, PT, DPT   Session Number   Session Number 6   Progress Note/Recertification   Progress Note Completed Date 22   Adult Goals   PT Ortho Eval Goals 1;2   Ortho Goal 1   Goal Identifier Balance   Goal Description Pt will be able to SL stand for 6 seconds for WB and stairs without assistive device   Target Date 22   Ortho Goal 2   Goal Identifier Gait   Goal Description Pt will be able to walk 5 minutes without assistive device and <4/10 LE pain in 12 weeks   Goal Progress progressing towards, no longer using the crutches   Target Date 22       Present No   Subjective Report   Subjective Report Pt arrives today reporting increased ankle pain and continuing to work 10 hour shifts and trying to perform full work demands. Has shooting pains during and end of shift and needs to sit down to alleviate pain. Reports has not been able to consistently perform HEP secondary to just feeling tired with work. Very tired and sore today   Objective Measures   Objective Measures Objective Measure 1;Objective Measure 2;Objective Measure 3;Objective Measure 4;Objective Measure  5   Objective Measure 1   Objective Measure LEFS   Details 13/80 on 9/12/22   Objective Measure 2   Objective Measure sit to stand   Details 17 1/2 inches chair seat, 10 reps in 30 sec.   Objective Measure 3   Objective Measure SL stand   Details 7 sec bilat- 7/10 right ankle (medial heel), 6/10 L ankle. + right Trendelenburg   Objective Measure 4   Objective Measure Left knee   Details Minimal medial and popliteal area swelling, MCL/LCL/ ACL/PCL intact, patellar mobility normal   Objective Measure 5   Objective Measure Gait   Details Improved gait with verbal cues to increase pace and cadance. Pt needing verbal cues to swing arms   Objective Measure 6   Objective Measure ankle ROM   Details PF: R 40 degrees, L 45, DF: R: 8 degrees, L 5 degrees,  eversion: :L 15 degrees R 20 degrees, Iversion: L 15 degrees, R 18 degrees,   Treatment Interventions   Interventions Therapeutic Procedure/Exercise;Manual Therapy   Therapeutic Procedure/exercise   Therapeutic Procedures: strength, endurance, ROM, flexibillity minutes (38252) 15   Skilled Intervention Updated  HEP, Patient education, Verbal and tactile cues utilized to facilitate correct exercise technique   Patient Response Tolerated well, fatigue in ankles R>L, sharp pain with walking   Treatment Detail For LE strength and ROM - level .70, 5 minutes. bridges 2 x 12, S/L clamshells x 12 B. SL hip abduction x 12 B; cues for neutral hips and 2 second holds.   Manual Therapy   Manual Therapy: Mobilization, MFR, MLD, friction massage minutes (10828) 25   Skilled Intervention Joint mobilizations, efflurage massage, STM   Patient Response Toelrated well, immediate R foot sharp heel pain improvement to no pain   Treatment Detail Pt supine with feet elevated on bolster -   For improved pain and ankle ROM - STM to B ankles, peroneals, medial and lateral malleoli. Joint mobilizations anterior posterior, cuneiforms and, mortise joint distractions. Educated pt on wearing socks which  has compression and wearing them pulled up vs balled at ankles to decreased fluid build up at ankles.   Equipment Needs   Equipment Needs Green band, - Medium conpression stockings suggested. Suggested orthotics   Plan   Homework PTRx   Home program Heel cord assisted and active stretches, 4 planes ankle AROM, isometrics bilat, JACK, SL stand to 30 seconds, toe splay, sit to stand, bridge, SL hip AB, clamshell,  heel raises, SLS, tandem stand braiding. Gastroc stretch, soleus stretch   Plan for next session Progress hip strengthening, progress balance to include unstable suface. Continue with MT and joint mobilizations if tolerated well   Comments   Comments Pt returns today for his follow up appointment. He continues to quickly fatigue and have poor ankle strength and proprioception R>L and has difficulty performing HEP due to increased fatigue from work. Tolerated maual therapy well today and had immediate pain relief response. Is appropriate to continue with PT - needs to work around work schedule and verbalizes very motivated to attend.   Total Session Time   Timed Code Treatment Minutes 40   Total Treatment Time (sum of timed and untimed services) 40   Medicare Claim Information   Medical Diagnosis Right and left ankle injuries 8/28/22       Plan:  Discharge from therapy.    Discharge:    Reason for Discharge: Pt has had several no shows and cancellations, per policy discontinue chart.     Discharge Plan: Patient to continue home program.

## 2023-01-30 ENCOUNTER — OFFICE VISIT (OUTPATIENT)
Dept: FAMILY MEDICINE | Facility: CLINIC | Age: 53
End: 2023-01-30

## 2023-01-30 VITALS
RESPIRATION RATE: 20 BRPM | WEIGHT: 186 LBS | OXYGEN SATURATION: 98 % | HEART RATE: 90 BPM | DIASTOLIC BLOOD PRESSURE: 92 MMHG | TEMPERATURE: 98.7 F | BODY MASS INDEX: 31.76 KG/M2 | SYSTOLIC BLOOD PRESSURE: 142 MMHG | HEIGHT: 64 IN

## 2023-01-30 DIAGNOSIS — L03.116 CELLULITIS OF LEFT LEG: Primary | ICD-10-CM

## 2023-01-30 PROCEDURE — 99213 OFFICE O/P EST LOW 20 MIN: CPT | Performed by: FAMILY MEDICINE

## 2023-01-30 RX ORDER — CEPHALEXIN 500 MG/1
CAPSULE ORAL
COMMUNITY
Start: 2023-01-24

## 2023-01-30 NOTE — PATIENT INSTRUCTIONS
Continue your cephalexin antibiotic until it is all gone    Use the compression stocking daily    Return if redness or swelling or pain returns

## 2023-01-30 NOTE — CONFIDENTIAL NOTE
In ED for left leg cellulitis 1/20/23.  IV abx at home via paramedic service for 3 days, then switched to Cepphalexin 500mg three times daily for 7 days.  '  Now redness gone. Swelling much improved.    Had ultrasound negative for DVT during ED visit.      Has psoriasis, not recently using any steroid creams.  Not seen dermatologist due to cost.

## 2023-02-04 NOTE — PROGRESS NOTES
HPI:   Dyllan Quintanilla is a 52 year old  malewho presents for:    Chief Complaint   Patient presents with     ER follow up      ER follow up on left leg 1/20/23 [facility]      52-year-old male meeting for the first time today.  He presents in follow-up after being evaluated in the [facility] emergency department and set up on a outpatient treatment for cellulitis.  He had the abrupt onset of left lower extremity swelling, redness and pain which rapidly progressed over a 24-hour period.  He was seen at [facility] emergency department on 1/20/2023.  I do not have a copy of the records for my review.  He was diagnosed with cellulitis of the left leg.  From the patient's description he was treated with IV antibiotics over the following 3 days through a program that sends a paramedic team to a patient's home for treatment.  He is not sure the name of the IV antibiotic.  His redness and pain of the left leg significantly improved during the 3-day course of IV antibiotics.  He continued to have swelling.  He never had fevers, chills, body aches or other systemic symptoms.  He was switched to an oral regimen of Keflex which she has been taking since 1/24/2023.  He has 1 more day of the Keflex course.  He no longer has pain of the left leg, and the redness has resolved.  He continues to have some swelling, but this is improved compared to 1 week ago.  He tells me they advised a knee-high compression stocking, but he has not not been able to get the compression stocking yet.  He does have an order for the stocking and plans to get this within the next couple of days.  Finances are a barrier to him getting health care.    He does work full-time.  Does not currently have health insurance through his employer.  He is exploring options for health insurance.  The finances of healthcare have been stressful for him recently.    He has a history of psoriasis.  He has been prescribed to bruise on steroid cream in the past, has  "not been using this recently.  The medication is expensive and also he found it was not entirely effective.             PMHX:     Patient Active Problem List   Diagnosis     Benign essential hypertension     Soft tissue injury of b/l LEs       Current Outpatient Medications   Medication Sig Dispense Refill     atorvastatin (LIPITOR) 40 MG tablet Take 1 tablet (40 mg) by mouth daily 90 tablet 3     betamethasone dipropionate (DIPROSONE) 0.05 % external cream Apply topically daily as needed (For psoriasis plaques) 15 g 1     cephALEXin (KEFLEX) 500 MG capsule TAKE 1 CAPSULE BY MOUTH THREE TIMES DAILY FOR 7 DAYS. DO NOT START BEFORE 1/24/23       losartan (COZAAR) 25 MG tablet Take 1 tablet (25 mg) by mouth daily 90 tablet 0       Social History     Tobacco Use     Smoking status: Never     Smokeless tobacco: Never     Tobacco comments:     Girlfriend smokes but outside   Substance Use Topics     Alcohol use: Yes     Alcohol/week: 12.0 standard drinks     Drug use: Not Currently       Social History     Social History Narrative    Has four daughters    Alejandra Greco, Abrahan, Latonya       Allergies   Allergen Reactions     No Known Allergies        No results found for this or any previous visit (from the past 24 hour(s)).         Review of Systems:     General: No fevers or chills  ENT: No upper respiratory symptoms  COVID screening questions are negative  Cardiovascular: No palpitations or chest pain  Respiratory: No dyspnea           Physical Exam:     Vitals:    01/30/23 1255 01/30/23 1329   BP: (!) 149/94 (!) 142/92   Pulse: 90    Resp: 20    Temp: 98.7  F (37.1  C)    TempSrc: Oral    SpO2: 98%    Weight: 84.4 kg (186 lb)    Height: 1.63 m (5' 4.17\")      Body mass index is 31.75 kg/m .    General: Alert, in no acute distress  HEENT: Head is free of trauma.   Sclerae non-icteric. PERRL, Moist oral mucus membranes, no tonsilar hypertrophy or exudate.   Resp: Clear to auscultation bilaterally  CV: Regular rate " and rhythm  Abd: Soft, non-tender.  Ext: He has dry, excoriated, somewhat erythematous skin on both shins with some skin breaks.  1+ edema bilaterally, left somewhat greater than right.  No tenderness to palpation.  No calf tenderness.  No drainage.  Psych: Mood appropriate       Assessment and Plan   1. Cellulitis of left leg  Acute cellulitis of the left leg has resolved  Recommend completing the course of Keflex  Return to clinic if any increase in redness, swelling, or return of pain    Obtain and use a knee-high compression stocking daily to reduce swelling, and also improve skin health    In order to prevent future episodes of cellulitis I recommend he work to get his psoriasis under better control, as skin breaks from psoriasis may contribute to cellulitis  We discussed retrying topical steroid creams, finding something more affordable, using good Rx, as well as seeking help insurance advice from our care coordinator or his employer      Follow up: 4 weeks for reevaluation of the skin, again he should contact the clinic at any time if he needs assistance in navigating health insurance  Options for treatment and follow-up care were reviewed with the patient and/or guardian. Dyllan Quintanilla and/or guardian engaged in the decision making process and verbalized understanding of the options discussed and agreed with the final plan.    Otis Saenz MD  Faculty - [facility] Medicine Residency Program

## 2023-08-04 ENCOUNTER — HOSPITAL ENCOUNTER (EMERGENCY)
Facility: HOSPITAL | Age: 53
Discharge: HOME | End: 2023-08-04
Admitting: NURSE PRACTITIONER

## 2023-08-04 VITALS
HEART RATE: 80 BPM | OXYGEN SATURATION: 98 % | DIASTOLIC BLOOD PRESSURE: 78 MMHG | WEIGHT: 180 LBS | RESPIRATION RATE: 16 BRPM | SYSTOLIC BLOOD PRESSURE: 148 MMHG | TEMPERATURE: 98.2 F | BODY MASS INDEX: 30.73 KG/M2 | HEIGHT: 64 IN

## 2023-08-04 DIAGNOSIS — L40.4 GUTTATE PSORIASIS: ICD-10-CM

## 2023-08-04 PROBLEM — I25.10 CORONARY ARTERY CALCIFICATION SEEN ON CAT SCAN: Status: ACTIVE | Noted: 2019-06-11

## 2023-08-04 PROBLEM — L03.90 CELLULITIS: Status: ACTIVE | Noted: 2020-04-14

## 2023-08-04 PROCEDURE — 99284 EMERGENCY DEPT VISIT MOD MDM: CPT

## 2023-08-04 RX ORDER — METHYLPREDNISOLONE 4 MG
TABLET, DOSE PACK ORAL
Qty: 21 TABLET | Refills: 0 | Status: SHIPPED | OUTPATIENT
Start: 2023-08-04

## 2023-08-04 RX ORDER — BETAMETHASONE DIPROPIONATE 0.05 %
OINTMENT (GRAM) TOPICAL 2 TIMES DAILY
Qty: 90 G | Refills: 0 | Status: SHIPPED | OUTPATIENT
Start: 2023-08-04 | End: 2023-08-18

## 2023-08-04 NOTE — ED NOTES
Pt first noted rash 2 weeks ago to bilateral hands and legs. Using vaseline without improvement. No new soaps or products. Denies hx of similar rashes. Denies etoh, drug use, or hx of this. C/o pruritus.

## 2023-08-04 NOTE — ED PROVIDER NOTES
EMERGENCY DEPARTMENT ENCOUNTER      NAME: Dyllan Quintanilla  AGE: 53 year old male  YOB: 1970  MRN: [id number]  EVALUATION DATE & TIME: 2023  4:08 PM    PCP: Kenny Kruse    ED PROVIDER: DOMINGO Garcia, CNP      Chief Complaint   Patient presents with    Rash         FINAL IMPRESSION:  1. Guttate psoriasis          ED COURSE & MEDICAL DECISION MAKIN:37 PM I met with the patient, obtained history, performed an initial exam, and discussed options and plan for treatment here in the ED.  4:48 PM We discussed plans for discharge including supportive cares, symptomatic treatment, outpatient follow up, and reasons to return to the emergency department.      Pertinent Labs & Imaging studies reviewed. (See chart for details)  53 year old male presents to the Emergency Department for evaluation of rash. Has scaly lesions on the upper and lower extremities.  History of psoriasis.  On the upper extremities is seems consistent with guttate psoriasis.  Denies sexual activity and has no genitourinary symptoms no syphilis seems less likely.  Nothing appears to be acutely infected.  Given the widespread area, will prescribe a Medrol Dosepak in addition to steroid ointment.  Recommend follow-up in clinic within 2 weeks for recheck.  Was also provided with return precautions      Medical Decision Making    History:  Supplemental history from: Documented in chart, if applicable  External Record(s) reviewed: Documented in chart, if applicable.    Work Up:  Chart documentation includes differential considered and any EKGs or imaging independently interpreted by provider, where specified.  In additional to work up documented, I considered the following work up: Documented in chart, if applicable.    External consultation:  Discussion of management with another provider: Documented in chart, if applicable    Complicating factors:  Care impacted by chronic illness: Hypertension  Care affected by social  determinants of health: Other: Currently setting up health insurance    Disposition considerations: Discharge. I provided the patient with new prescriptions. See documentation for any additional details.        At the conclusion of the encounter I discussed the results of all of the tests and the disposition. The questions were answered. The patient or family acknowledged understanding and was agreeable with the care plan.       0 minutes of critical care time     MEDICATIONS GIVEN IN THE EMERGENCY:  Medications - No data to display    NEW PRESCRIPTIONS STARTED AT TODAY'S ER VISIT  New Prescriptions    BETAMETHASONE DIPROPIONATE (DIPROSONE) 0.05 % EXTERNAL OINTMENT    Apply topically 2 times daily for 14 days    METHYLPREDNISOLONE (MEDROL DOSEPAK) 4 MG TABLET THERAPY PACK    Follow Package Directions            =================================================================    Patient information was obtained from: Patient   Use of Intrepreter: N/A    Limitations to History: None     HPI    Dyllan Quintanilla is a 53 year old male with a history of cellulitis and hypertension who presents with rash.    The patient presented that he has bumps on his arms and calves that appeared 2 weeks ago. He reported that he was putting Vaseline on it which helped, however, has now gotten worse. He states that he feels tired.    He reported that he has psoriasis before on his legs and elbows. He stated that he is currently trying to set up health insurance. He reports he hasn't seen a dermatologist yet.    He denies fevers, chills, and urination symptoms. Not currently sexually active. No other acute symptoms presented.     Review of Systems   Skin:  Positive for rash.   All other systems reviewed and are negative.      PAST MEDICAL HISTORY:  Past Medical History:   Diagnosis Date    Coronary artery calcification seen on CT scan     LAD calcification on CT    HTN (hypertension)     Soft tissue injury of b/l LEs 9/20/2022    B/L LEs  "with soft tissue injury without fx after 300lb susy rolled over legs.       PAST SURGICAL HISTORY:  Past Surgical History:   Procedure Laterality Date    MANDIBLE FRACTURE SURGERY             CURRENT MEDICATIONS:    No current facility-administered medications for this encounter.     Current Outpatient Medications   Medication    betamethasone dipropionate (DIPROSONE) 0.05 % external ointment    methylPREDNISolone (MEDROL DOSEPAK) 4 MG tablet therapy pack    atorvastatin (LIPITOR) 40 MG tablet    cephALEXin (KEFLEX) 500 MG capsule    losartan (COZAAR) 25 MG tablet         ALLERGIES:  Allergies   Allergen Reactions    No Known Allergies          VITALS:  Patient Vitals for the past 24 hrs:   BP Temp Temp src Pulse Resp SpO2 Height Weight   08/04/23 1621 -- -- -- -- -- -- 1.626 m (5' 4\") --   08/04/23 1411 (!) 158/87 98.2  F (36.8  C) Temporal 84 18 96 % -- 81.6 kg (180 lb)       PHYSICAL EXAM    Constitutional:  alert, no distress  EYES: Conjunctivae clear  HENT:  Atraumatic, normocephalic  Respiratory:  No respiratory distress  Musculoskeletal: 2+ bilateral ankle edema  Integument: large erythematous scaly patches on the lower extremities.  Guttate scaly lesions are noted on the distal upper extremities and dorsal surface of the hands.  There is scaly skin on the palms.  Neurologic:  Alert & oriented x 3              LAB:  All pertinent labs reviewed and interpreted.  Labs Ordered and Resulted from Time of ED Arrival to Time of ED Departure - No data to display      RADIOLOGY:  Reviewed all pertinent imaging. Please see official radiology report.  No orders to display             PROCEDURES:   None      Suma VELÁSQUEZ , am serving as a scribe to document services personally performed by Christiano Barron CNP. based on my observation and the provider's statements to me. IChristiano CNP attest that Suma Cline  is acting in a scribe capacity, has observed my performance of the services and has documented them " in accordance with my direction.    DOMINGO Garcia, CNP  Emergency Services  [facility] EMERGENCY DEPARTMENT  [facility] [facility] [address]  [phone]  Dept: [phone]         Christiano Barron APRN CNP  08/04/23 1639

## 2023-08-04 NOTE — Clinical Note
Dyllan Quintanilla was seen and treated in our emergency department on 8/4/2023.         Sincerely,     [facility] Emergency Department

## 2023-08-04 NOTE — ED TRIAGE NOTES
The pt presents for evaluation of rash to hands, arms, and legs. Multiple wounds to hands/arms.

## 2023-08-08 ENCOUNTER — PATIENT OUTREACH (OUTPATIENT)
Dept: CARE COORDINATION | Facility: CLINIC | Age: 53
End: 2023-08-08

## 2023-08-09 ENCOUNTER — PATIENT OUTREACH (OUTPATIENT)
Dept: CARE COORDINATION | Facility: CLINIC | Age: 53
End: 2023-08-09

## 2023-08-09 NOTE — PROGRESS NOTES
Clinic Care Coordination Contact  Follow Up Progress Note      Assessment: The pt was recently in the ED, I called to check up on the pt, and help the pt setup a ED follow up. The pt was at [facility] for a rash. I called the pt,but got his vm, so I left a vm for the pt to give me a call back.        Care Gaps:    Health Maintenance Due   Topic Date Due    YEARLY PREVENTIVE VISIT  Never done    ADVANCE CARE PLANNING  Never done    HEPATITIS B IMMUNIZATION (1 of 3 - 3-dose series) Never done    COVID-19 Vaccine (1) Never done    Pneumococcal Vaccine: Pediatrics (0 to 5 Years) and At-Risk Patients (6 to 64 Years) (1 - PCV) Never done    COLORECTAL CANCER SCREENING  Never done    HIV SCREENING  Never done    HEPATITIS C SCREENING  Never done    ZOSTER IMMUNIZATION (1 of 2) Never done           Care Plans      Intervention/Education provided during outreach:             Plan:     Care Coordinator will follow up in

## 2023-11-16 ENCOUNTER — PATIENT OUTREACH (OUTPATIENT)
Dept: FAMILY MEDICINE | Facility: CLINIC | Age: 53
End: 2023-11-16

## 2023-11-16 NOTE — TELEPHONE ENCOUNTER
Patient Quality Outreach    Patient is due for the following:   IVD  - BP check, LDL    Next Steps:   Schedule a office visit for BP check    Type of outreach:    Phone, left message for patient/parent to call back.      Questions for provider review:    none           Yasmin Luciano

## 2023-11-22 NOTE — TELEPHONE ENCOUNTER
Patient Quality Outreach    Patient is due for the following:   Hypertension -  BP check    Next Steps:   Patient will call back to schedule. He will need a return visit with Dr. Kruse at any time for a BP check/HTN follow up.    Type of outreach:    Phone, spoke to patient/parent. Patient/parent will call back to schedule.      Questions for provider review:    None           Yasmin Luciano  Chart routed to Care Team.

## 2024-05-08 NOTE — PROGRESS NOTES
Clinic Care Coordination Contact  Follow Up Progress Note      Assessment: The pt was recently in the ED, I called to check up on the pt, and help the pt setup a ED follow up. The pt was at [facility] for a rash. I called the pt,but got his vm, so I left a vm for the pt to give me a call back.     Care Gaps:    Health Maintenance Due   Topic Date Due    YEARLY PREVENTIVE VISIT  Never done    ADVANCE CARE PLANNING  Never done    HEPATITIS B IMMUNIZATION (1 of 3 - 3-dose series) Never done    COVID-19 Vaccine (1) Never done    Pneumococcal Vaccine: Pediatrics (0 to 5 Years) and At-Risk Patients (6 to 64 Years) (1 - PCV) Never done    COLORECTAL CANCER SCREENING  Never done    HIV SCREENING  Never done    HEPATITIS C SCREENING  Never done    ZOSTER IMMUNIZATION (1 of 2) Never done           Care Plans      Intervention/Education provided during outreach:               Plan:     Care Coordinator will follow up in   
resilient/elastic